# Patient Record
Sex: FEMALE | Race: OTHER | URBAN - METROPOLITAN AREA
[De-identification: names, ages, dates, MRNs, and addresses within clinical notes are randomized per-mention and may not be internally consistent; named-entity substitution may affect disease eponyms.]

---

## 2021-01-01 ENCOUNTER — EMERGENCY (EMERGENCY)
Facility: HOSPITAL | Age: 86
LOS: 1 days | Discharge: HOME | End: 2021-01-01
Attending: EMERGENCY MEDICINE | Admitting: EMERGENCY MEDICINE
Payer: MEDICARE

## 2021-01-01 ENCOUNTER — INPATIENT (INPATIENT)
Facility: HOSPITAL | Age: 86
LOS: 14 days | End: 2021-06-19
Attending: HOSPITALIST | Admitting: HOSPITALIST
Payer: MEDICARE

## 2021-01-01 VITALS
HEART RATE: 96 BPM | RESPIRATION RATE: 20 BRPM | DIASTOLIC BLOOD PRESSURE: 60 MMHG | OXYGEN SATURATION: 96 % | SYSTOLIC BLOOD PRESSURE: 133 MMHG

## 2021-01-01 VITALS
SYSTOLIC BLOOD PRESSURE: 116 MMHG | RESPIRATION RATE: 20 BRPM | OXYGEN SATURATION: 100 % | DIASTOLIC BLOOD PRESSURE: 61 MMHG | HEART RATE: 102 BPM | TEMPERATURE: 99 F

## 2021-01-01 VITALS
HEART RATE: 133 BPM | DIASTOLIC BLOOD PRESSURE: 72 MMHG | OXYGEN SATURATION: 97 % | SYSTOLIC BLOOD PRESSURE: 114 MMHG | RESPIRATION RATE: 16 BRPM

## 2021-01-01 VITALS
DIASTOLIC BLOOD PRESSURE: 50 MMHG | SYSTOLIC BLOOD PRESSURE: 100 MMHG | HEART RATE: 101 BPM | OXYGEN SATURATION: 95 % | TEMPERATURE: 97 F | RESPIRATION RATE: 20 BRPM

## 2021-01-01 DIAGNOSIS — T68.XXXA HYPOTHERMIA, INITIAL ENCOUNTER: ICD-10-CM

## 2021-01-01 DIAGNOSIS — Z02.9 ENCOUNTER FOR ADMINISTRATIVE EXAMINATIONS, UNSPECIFIED: ICD-10-CM

## 2021-01-01 DIAGNOSIS — Z71.89 OTHER SPECIFIED COUNSELING: ICD-10-CM

## 2021-01-01 DIAGNOSIS — N17.9 ACUTE KIDNEY FAILURE, UNSPECIFIED: ICD-10-CM

## 2021-01-01 DIAGNOSIS — I10 ESSENTIAL (PRIMARY) HYPERTENSION: ICD-10-CM

## 2021-01-01 DIAGNOSIS — R52 PAIN, UNSPECIFIED: ICD-10-CM

## 2021-01-01 DIAGNOSIS — N39.0 URINARY TRACT INFECTION, SITE NOT SPECIFIED: ICD-10-CM

## 2021-01-01 DIAGNOSIS — R13.10 DYSPHAGIA, UNSPECIFIED: ICD-10-CM

## 2021-01-01 DIAGNOSIS — A41.9 SEPSIS, UNSPECIFIED ORGANISM: ICD-10-CM

## 2021-01-01 DIAGNOSIS — E87.2 ACIDOSIS: ICD-10-CM

## 2021-01-01 DIAGNOSIS — R62.51 FAILURE TO THRIVE (CHILD): ICD-10-CM

## 2021-01-01 DIAGNOSIS — G93.41 METABOLIC ENCEPHALOPATHY: ICD-10-CM

## 2021-01-01 DIAGNOSIS — S32.000A WEDGE COMPRESSION FRACTURE OF UNSPECIFIED LUMBAR VERTEBRA, INITIAL ENCOUNTER FOR CLOSED FRACTURE: ICD-10-CM

## 2021-01-01 DIAGNOSIS — Z51.5 ENCOUNTER FOR PALLIATIVE CARE: ICD-10-CM

## 2021-01-01 DIAGNOSIS — R62.7 ADULT FAILURE TO THRIVE: ICD-10-CM

## 2021-01-01 DIAGNOSIS — R53.2 FUNCTIONAL QUADRIPLEGIA: ICD-10-CM

## 2021-01-01 DIAGNOSIS — G30.9 ALZHEIMER'S DISEASE, UNSPECIFIED: ICD-10-CM

## 2021-01-01 DIAGNOSIS — E87.0 HYPEROSMOLALITY AND HYPERNATREMIA: ICD-10-CM

## 2021-01-01 DIAGNOSIS — D64.9 ANEMIA, UNSPECIFIED: ICD-10-CM

## 2021-01-01 DIAGNOSIS — R94.31 ABNORMAL ELECTROCARDIOGRAM [ECG] [EKG]: ICD-10-CM

## 2021-01-01 DIAGNOSIS — R00.1 BRADYCARDIA, UNSPECIFIED: ICD-10-CM

## 2021-01-01 LAB
-  AMIKACIN: SIGNIFICANT CHANGE UP
-  AMOXICILLIN/CLAVULANIC ACID: SIGNIFICANT CHANGE UP
-  AMPICILLIN/SULBACTAM: SIGNIFICANT CHANGE UP
-  AMPICILLIN: SIGNIFICANT CHANGE UP
-  AZTREONAM: SIGNIFICANT CHANGE UP
-  CEFAZOLIN: SIGNIFICANT CHANGE UP
-  CEFEPIME: SIGNIFICANT CHANGE UP
-  CEFOXITIN: SIGNIFICANT CHANGE UP
-  CEFTRIAXONE: SIGNIFICANT CHANGE UP
-  CIPROFLOXACIN: SIGNIFICANT CHANGE UP
-  ERTAPENEM: SIGNIFICANT CHANGE UP
-  GENTAMICIN: SIGNIFICANT CHANGE UP
-  IMIPENEM: SIGNIFICANT CHANGE UP
-  LEVOFLOXACIN: SIGNIFICANT CHANGE UP
-  MEROPENEM: SIGNIFICANT CHANGE UP
-  NITROFURANTOIN: SIGNIFICANT CHANGE UP
-  PIPERACILLIN/TAZOBACTAM: SIGNIFICANT CHANGE UP
-  TIGECYCLINE: SIGNIFICANT CHANGE UP
-  TOBRAMYCIN: SIGNIFICANT CHANGE UP
-  TRIMETHOPRIM/SULFAMETHOXAZOLE: SIGNIFICANT CHANGE UP
ALBUMIN SERPL ELPH-MCNC: 2.8 G/DL — LOW (ref 3.3–5)
ALBUMIN SERPL ELPH-MCNC: 3 G/DL — LOW (ref 3.3–5)
ALP SERPL-CCNC: 104 U/L — SIGNIFICANT CHANGE UP (ref 40–120)
ALP SERPL-CCNC: 116 U/L — SIGNIFICANT CHANGE UP (ref 40–120)
ALT FLD-CCNC: 22 U/L — SIGNIFICANT CHANGE UP (ref 4–33)
ALT FLD-CCNC: 73 U/L — HIGH (ref 4–33)
ANION GAP SERPL CALC-SCNC: 11 MMOL/L — SIGNIFICANT CHANGE UP (ref 7–14)
ANION GAP SERPL CALC-SCNC: 16 MMOL/L — HIGH (ref 7–14)
ANION GAP SERPL CALC-SCNC: 16 MMOL/L — HIGH (ref 7–14)
ANION GAP SERPL CALC-SCNC: 17 MMOL/L — HIGH (ref 7–14)
ANION GAP SERPL CALC-SCNC: 18 MMOL/L — HIGH (ref 7–14)
ANION GAP SERPL CALC-SCNC: 18 MMOL/L — HIGH (ref 7–14)
ANION GAP SERPL CALC-SCNC: 19 MMOL/L — HIGH (ref 7–14)
ANION GAP SERPL CALC-SCNC: 19 MMOL/L — HIGH (ref 7–14)
ANION GAP SERPL CALC-SCNC: 20 MMOL/L — HIGH (ref 7–14)
ANION GAP SERPL CALC-SCNC: 21 MMOL/L — HIGH (ref 7–14)
ANION GAP SERPL CALC-SCNC: 21 MMOL/L — HIGH (ref 7–14)
ANION GAP SERPL CALC-SCNC: 25 MMOL/L — HIGH (ref 7–14)
APPEARANCE UR: ABNORMAL
APPEARANCE UR: ABNORMAL
APPEARANCE UR: CLEAR — SIGNIFICANT CHANGE UP
APTT BLD: 29.6 SEC — SIGNIFICANT CHANGE UP (ref 27–36.3)
AST SERPL-CCNC: 28 U/L — SIGNIFICANT CHANGE UP (ref 4–32)
AST SERPL-CCNC: 41 U/L — HIGH (ref 4–32)
BACTERIA # UR AUTO: ABNORMAL
BACTERIA # UR AUTO: ABNORMAL
BASE EXCESS BLDV CALC-SCNC: -2.1 MMOL/L — SIGNIFICANT CHANGE UP (ref -3–2)
BASE EXCESS BLDV CALC-SCNC: 3.6 MMOL/L — HIGH (ref -3–2)
BASOPHILS # BLD AUTO: 0.02 K/UL — SIGNIFICANT CHANGE UP (ref 0–0.2)
BASOPHILS # BLD AUTO: 0.02 K/UL — SIGNIFICANT CHANGE UP (ref 0–0.2)
BASOPHILS # BLD AUTO: 0.03 K/UL — SIGNIFICANT CHANGE UP (ref 0–0.2)
BASOPHILS # BLD AUTO: 0.04 K/UL — SIGNIFICANT CHANGE UP (ref 0–0.2)
BASOPHILS NFR BLD AUTO: 0.1 % — SIGNIFICANT CHANGE UP (ref 0–2)
BASOPHILS NFR BLD AUTO: 0.1 % — SIGNIFICANT CHANGE UP (ref 0–2)
BASOPHILS NFR BLD AUTO: 0.2 % — SIGNIFICANT CHANGE UP (ref 0–2)
BASOPHILS NFR BLD AUTO: 0.3 % — SIGNIFICANT CHANGE UP (ref 0–2)
BILIRUB SERPL-MCNC: 0.3 MG/DL — SIGNIFICANT CHANGE UP (ref 0.2–1.2)
BILIRUB SERPL-MCNC: 0.6 MG/DL — SIGNIFICANT CHANGE UP (ref 0.2–1.2)
BILIRUB UR-MCNC: NEGATIVE — SIGNIFICANT CHANGE UP
BLOOD GAS VENOUS - CREATININE: 0.5 MG/DL — SIGNIFICANT CHANGE UP (ref 0.5–1.3)
BLOOD GAS VENOUS - CREATININE: 5.7 MG/DL — HIGH (ref 0.5–1.3)
BLOOD GAS VENOUS COMPREHENSIVE RESULT: SIGNIFICANT CHANGE UP
BUN SERPL-MCNC: 105 MG/DL — HIGH (ref 7–23)
BUN SERPL-MCNC: 105 MG/DL — HIGH (ref 7–23)
BUN SERPL-MCNC: 109 MG/DL — HIGH (ref 7–23)
BUN SERPL-MCNC: 110 MG/DL — HIGH (ref 7–23)
BUN SERPL-MCNC: 112 MG/DL — HIGH (ref 7–23)
BUN SERPL-MCNC: 118 MG/DL — HIGH (ref 7–23)
BUN SERPL-MCNC: 122 MG/DL — HIGH (ref 7–23)
BUN SERPL-MCNC: 124 MG/DL — HIGH (ref 7–23)
BUN SERPL-MCNC: 129 MG/DL — HIGH (ref 7–23)
BUN SERPL-MCNC: 130 MG/DL — HIGH (ref 7–23)
BUN SERPL-MCNC: 139 MG/DL — HIGH (ref 7–23)
BUN SERPL-MCNC: 139 MG/DL — HIGH (ref 7–23)
BUN SERPL-MCNC: 14 MG/DL — SIGNIFICANT CHANGE UP (ref 7–23)
BUN SERPL-MCNC: 140 MG/DL — HIGH (ref 7–23)
BUN SERPL-MCNC: 144 MG/DL — HIGH (ref 7–23)
CALCIUM SERPL-MCNC: 7 MG/DL — LOW (ref 8.4–10.5)
CALCIUM SERPL-MCNC: 7.2 MG/DL — LOW (ref 8.4–10.5)
CALCIUM SERPL-MCNC: 7.3 MG/DL — LOW (ref 8.4–10.5)
CALCIUM SERPL-MCNC: 7.4 MG/DL — LOW (ref 8.4–10.5)
CALCIUM SERPL-MCNC: 7.5 MG/DL — LOW (ref 8.4–10.5)
CALCIUM SERPL-MCNC: 7.6 MG/DL — LOW (ref 8.4–10.5)
CALCIUM SERPL-MCNC: 7.6 MG/DL — LOW (ref 8.4–10.5)
CALCIUM SERPL-MCNC: 7.8 MG/DL — LOW (ref 8.4–10.5)
CALCIUM SERPL-MCNC: 7.9 MG/DL — LOW (ref 8.4–10.5)
CALCIUM SERPL-MCNC: 8 MG/DL — LOW (ref 8.4–10.5)
CALCIUM SERPL-MCNC: 8 MG/DL — LOW (ref 8.4–10.5)
CALCIUM SERPL-MCNC: 8.1 MG/DL — LOW (ref 8.4–10.5)
CALCIUM SERPL-MCNC: 8.2 MG/DL — LOW (ref 8.4–10.5)
CALCIUM SERPL-MCNC: 8.6 MG/DL — SIGNIFICANT CHANGE UP (ref 8.4–10.5)
CALCIUM SERPL-MCNC: 8.7 MG/DL — SIGNIFICANT CHANGE UP (ref 8.4–10.5)
CHLORIDE BLDV-SCNC: 102 MMOL/L — SIGNIFICANT CHANGE UP (ref 96–108)
CHLORIDE BLDV-SCNC: >120 MMOL/L — HIGH (ref 96–108)
CHLORIDE SERPL-SCNC: 106 MMOL/L — SIGNIFICANT CHANGE UP (ref 98–107)
CHLORIDE SERPL-SCNC: 108 MMOL/L — HIGH (ref 98–107)
CHLORIDE SERPL-SCNC: 108 MMOL/L — HIGH (ref 98–107)
CHLORIDE SERPL-SCNC: 109 MMOL/L — HIGH (ref 98–107)
CHLORIDE SERPL-SCNC: 117 MMOL/L — HIGH (ref 98–107)
CHLORIDE SERPL-SCNC: 121 MMOL/L — HIGH (ref 98–107)
CHLORIDE SERPL-SCNC: 122 MMOL/L — HIGH (ref 98–107)
CHLORIDE SERPL-SCNC: 91 MMOL/L — LOW (ref 98–107)
CHLORIDE SERPL-SCNC: 94 MMOL/L — LOW (ref 98–107)
CHLORIDE SERPL-SCNC: 94 MMOL/L — LOW (ref 98–107)
CHLORIDE SERPL-SCNC: 95 MMOL/L — LOW (ref 98–107)
CHLORIDE SERPL-SCNC: 96 MMOL/L — LOW (ref 98–107)
CK SERPL-CCNC: 22 U/L — LOW (ref 25–170)
CO2 SERPL-SCNC: 10 MMOL/L — CRITICAL LOW (ref 22–31)
CO2 SERPL-SCNC: 11 MMOL/L — LOW (ref 22–31)
CO2 SERPL-SCNC: 11 MMOL/L — LOW (ref 22–31)
CO2 SERPL-SCNC: 12 MMOL/L — LOW (ref 22–31)
CO2 SERPL-SCNC: 15 MMOL/L — LOW (ref 22–31)
CO2 SERPL-SCNC: 16 MMOL/L — LOW (ref 22–31)
CO2 SERPL-SCNC: 17 MMOL/L — LOW (ref 22–31)
CO2 SERPL-SCNC: 18 MMOL/L — LOW (ref 22–31)
CO2 SERPL-SCNC: 19 MMOL/L — LOW (ref 22–31)
CO2 SERPL-SCNC: 20 MMOL/L — LOW (ref 22–31)
CO2 SERPL-SCNC: 21 MMOL/L — LOW (ref 22–31)
CO2 SERPL-SCNC: 22 MMOL/L — SIGNIFICANT CHANGE UP (ref 22–31)
CO2 SERPL-SCNC: 26 MMOL/L — SIGNIFICANT CHANGE UP (ref 22–31)
COLOR SPEC: ABNORMAL
COLOR SPEC: SIGNIFICANT CHANGE UP
COLOR SPEC: SIGNIFICANT CHANGE UP
COVID-19 SPIKE DOMAIN AB INTERP: POSITIVE
COVID-19 SPIKE DOMAIN ANTIBODY RESULT: >250 U/ML — HIGH
CREAT SERPL-MCNC: 0.45 MG/DL — LOW (ref 0.5–1.3)
CREAT SERPL-MCNC: 4.98 MG/DL — HIGH (ref 0.5–1.3)
CREAT SERPL-MCNC: 5.04 MG/DL — HIGH (ref 0.5–1.3)
CREAT SERPL-MCNC: 5.15 MG/DL — HIGH (ref 0.5–1.3)
CREAT SERPL-MCNC: 5.17 MG/DL — HIGH (ref 0.5–1.3)
CREAT SERPL-MCNC: 5.22 MG/DL — HIGH (ref 0.5–1.3)
CREAT SERPL-MCNC: 5.27 MG/DL — HIGH (ref 0.5–1.3)
CREAT SERPL-MCNC: 5.28 MG/DL — HIGH (ref 0.5–1.3)
CREAT SERPL-MCNC: 5.29 MG/DL — HIGH (ref 0.5–1.3)
CREAT SERPL-MCNC: 5.36 MG/DL — HIGH (ref 0.5–1.3)
CREAT SERPL-MCNC: 5.53 MG/DL — HIGH (ref 0.5–1.3)
CREAT SERPL-MCNC: 5.55 MG/DL — HIGH (ref 0.5–1.3)
CREAT SERPL-MCNC: 5.61 MG/DL — HIGH (ref 0.5–1.3)
CREAT SERPL-MCNC: 5.63 MG/DL — HIGH (ref 0.5–1.3)
CREAT SERPL-MCNC: 5.96 MG/DL — HIGH (ref 0.5–1.3)
CULTURE RESULTS: SIGNIFICANT CHANGE UP
DIFF PNL FLD: ABNORMAL
EOSINOPHIL # BLD AUTO: 0 K/UL — SIGNIFICANT CHANGE UP (ref 0–0.5)
EOSINOPHIL # BLD AUTO: 0.05 K/UL — SIGNIFICANT CHANGE UP (ref 0–0.5)
EOSINOPHIL # BLD AUTO: 0.16 K/UL — SIGNIFICANT CHANGE UP (ref 0–0.5)
EOSINOPHIL # BLD AUTO: 0.17 K/UL — SIGNIFICANT CHANGE UP (ref 0–0.5)
EOSINOPHIL NFR BLD AUTO: 0 % — SIGNIFICANT CHANGE UP (ref 0–6)
EOSINOPHIL NFR BLD AUTO: 0.2 % — SIGNIFICANT CHANGE UP (ref 0–6)
EOSINOPHIL NFR BLD AUTO: 0.9 % — SIGNIFICANT CHANGE UP (ref 0–6)
EOSINOPHIL NFR BLD AUTO: 1.2 % — SIGNIFICANT CHANGE UP (ref 0–6)
EPI CELLS # UR: 2 /HPF — SIGNIFICANT CHANGE UP (ref 0–5)
EPI CELLS # UR: SIGNIFICANT CHANGE UP
FERRITIN SERPL-MCNC: 732 NG/ML — HIGH (ref 15–150)
GAS PNL BLDV: 129 MMOL/L — LOW (ref 136–146)
GAS PNL BLDV: 165 MMOL/L — CRITICAL HIGH (ref 136–146)
GLUCOSE BLDC GLUCOMTR-MCNC: 100 MG/DL — HIGH (ref 70–99)
GLUCOSE BLDC GLUCOMTR-MCNC: 138 MG/DL — HIGH (ref 70–99)
GLUCOSE BLDC GLUCOMTR-MCNC: 142 MG/DL — HIGH (ref 70–99)
GLUCOSE BLDC GLUCOMTR-MCNC: 152 MG/DL — HIGH (ref 70–99)
GLUCOSE BLDC GLUCOMTR-MCNC: 161 MG/DL — HIGH (ref 70–99)
GLUCOSE BLDC GLUCOMTR-MCNC: 181 MG/DL — HIGH (ref 70–99)
GLUCOSE BLDV-MCNC: 108 MG/DL — HIGH (ref 70–99)
GLUCOSE BLDV-MCNC: 148 MG/DL — HIGH (ref 70–99)
GLUCOSE SERPL-MCNC: 105 MG/DL — HIGH (ref 70–99)
GLUCOSE SERPL-MCNC: 107 MG/DL — HIGH (ref 70–99)
GLUCOSE SERPL-MCNC: 111 MG/DL — HIGH (ref 70–99)
GLUCOSE SERPL-MCNC: 123 MG/DL — HIGH (ref 70–99)
GLUCOSE SERPL-MCNC: 126 MG/DL — HIGH (ref 70–99)
GLUCOSE SERPL-MCNC: 132 MG/DL — HIGH (ref 70–99)
GLUCOSE SERPL-MCNC: 146 MG/DL — HIGH (ref 70–99)
GLUCOSE SERPL-MCNC: 157 MG/DL — HIGH (ref 70–99)
GLUCOSE SERPL-MCNC: 167 MG/DL — HIGH (ref 70–99)
GLUCOSE SERPL-MCNC: 172 MG/DL — HIGH (ref 70–99)
GLUCOSE SERPL-MCNC: 195 MG/DL — HIGH (ref 70–99)
GLUCOSE SERPL-MCNC: 82 MG/DL — SIGNIFICANT CHANGE UP (ref 70–99)
GLUCOSE SERPL-MCNC: 92 MG/DL — SIGNIFICANT CHANGE UP (ref 70–99)
GLUCOSE SERPL-MCNC: 94 MG/DL — SIGNIFICANT CHANGE UP (ref 70–99)
GLUCOSE SERPL-MCNC: 96 MG/DL — SIGNIFICANT CHANGE UP (ref 70–99)
GLUCOSE UR QL: NEGATIVE — SIGNIFICANT CHANGE UP
HCO3 BLDV-SCNC: 22 MMOL/L — SIGNIFICANT CHANGE UP (ref 20–27)
HCO3 BLDV-SCNC: 27 MMOL/L — SIGNIFICANT CHANGE UP (ref 20–27)
HCT VFR BLD CALC: 25.5 % — LOW (ref 34.5–45)
HCT VFR BLD CALC: 26.2 % — LOW (ref 34.5–45)
HCT VFR BLD CALC: 26.5 % — LOW (ref 34.5–45)
HCT VFR BLD CALC: 27.7 % — LOW (ref 34.5–45)
HCT VFR BLD CALC: 29.9 % — LOW (ref 34.5–45)
HCT VFR BLD CALC: 35.1 % — SIGNIFICANT CHANGE UP (ref 34.5–45)
HCT VFR BLDA CALC: 29.3 % — LOW (ref 34.5–46.5)
HCT VFR BLDA CALC: 31.1 % — LOW (ref 34.5–46.5)
HGB BLD CALC-MCNC: 10.1 G/DL — LOW (ref 11.5–15.5)
HGB BLD CALC-MCNC: 9.5 G/DL — LOW (ref 11.5–15.5)
HGB BLD-MCNC: 10.2 G/DL — LOW (ref 11.5–15.5)
HGB BLD-MCNC: 7.6 G/DL — LOW (ref 11.5–15.5)
HGB BLD-MCNC: 7.6 G/DL — LOW (ref 11.5–15.5)
HGB BLD-MCNC: 8 G/DL — LOW (ref 11.5–15.5)
HGB BLD-MCNC: 8 G/DL — LOW (ref 11.5–15.5)
HGB BLD-MCNC: 9.2 G/DL — LOW (ref 11.5–15.5)
HYALINE CASTS # UR AUTO: 1 /LPF — SIGNIFICANT CHANGE UP (ref 0–7)
IANC: 10.83 K/UL — HIGH (ref 1.5–8.5)
IANC: 13.39 K/UL — HIGH (ref 1.5–8.5)
IANC: 16.73 K/UL — HIGH (ref 1.5–8.5)
IANC: 17.31 K/UL — HIGH (ref 1.5–8.5)
IMM GRANULOCYTES NFR BLD AUTO: 0.8 % — SIGNIFICANT CHANGE UP (ref 0–1.5)
IMM GRANULOCYTES NFR BLD AUTO: 0.9 % — SIGNIFICANT CHANGE UP (ref 0–1.5)
IMM GRANULOCYTES NFR BLD AUTO: 1.1 % — SIGNIFICANT CHANGE UP (ref 0–1.5)
IMM GRANULOCYTES NFR BLD AUTO: 1.5 % — SIGNIFICANT CHANGE UP (ref 0–1.5)
INR BLD: 1.15 RATIO — SIGNIFICANT CHANGE UP (ref 0.88–1.16)
IRON SATN MFR SERPL: 20 % — SIGNIFICANT CHANGE UP (ref 14–50)
IRON SATN MFR SERPL: 25 UG/DL — LOW (ref 30–160)
KETONES UR-MCNC: NEGATIVE — SIGNIFICANT CHANGE UP
LACTATE BLDV-MCNC: 1.7 MMOL/L — SIGNIFICANT CHANGE UP (ref 0.5–2)
LACTATE BLDV-MCNC: 3.5 MMOL/L — HIGH (ref 0.5–2)
LACTATE SERPL-SCNC: 2.5 MMOL/L — HIGH (ref 0.5–2)
LEUKOCYTE ESTERASE UR-ACNC: ABNORMAL
LEUKOCYTE ESTERASE UR-ACNC: ABNORMAL
LEUKOCYTE ESTERASE UR-ACNC: NEGATIVE — SIGNIFICANT CHANGE UP
LYMPHOCYTES # BLD AUTO: 1.9 K/UL — SIGNIFICANT CHANGE UP (ref 1–3.3)
LYMPHOCYTES # BLD AUTO: 12.1 % — LOW (ref 13–44)
LYMPHOCYTES # BLD AUTO: 12.5 % — LOW (ref 13–44)
LYMPHOCYTES # BLD AUTO: 13.5 % — SIGNIFICANT CHANGE UP (ref 13–44)
LYMPHOCYTES # BLD AUTO: 16.7 % — SIGNIFICANT CHANGE UP (ref 13–44)
LYMPHOCYTES # BLD AUTO: 2.45 K/UL — SIGNIFICANT CHANGE UP (ref 1–3.3)
LYMPHOCYTES # BLD AUTO: 2.64 K/UL — SIGNIFICANT CHANGE UP (ref 1–3.3)
LYMPHOCYTES # BLD AUTO: 2.93 K/UL — SIGNIFICANT CHANGE UP (ref 1–3.3)
MAGNESIUM SERPL-MCNC: 2 MG/DL — SIGNIFICANT CHANGE UP (ref 1.6–2.6)
MAGNESIUM SERPL-MCNC: 2 MG/DL — SIGNIFICANT CHANGE UP (ref 1.6–2.6)
MAGNESIUM SERPL-MCNC: 2.1 MG/DL — SIGNIFICANT CHANGE UP (ref 1.6–2.6)
MAGNESIUM SERPL-MCNC: 2.1 MG/DL — SIGNIFICANT CHANGE UP (ref 1.6–2.6)
MAGNESIUM SERPL-MCNC: 2.3 MG/DL — SIGNIFICANT CHANGE UP (ref 1.6–2.6)
MAGNESIUM SERPL-MCNC: 2.5 MG/DL — SIGNIFICANT CHANGE UP (ref 1.6–2.6)
MAGNESIUM SERPL-MCNC: 2.5 MG/DL — SIGNIFICANT CHANGE UP (ref 1.6–2.6)
MAGNESIUM SERPL-MCNC: 2.7 MG/DL — HIGH (ref 1.6–2.6)
MAGNESIUM SERPL-MCNC: 2.8 MG/DL — HIGH (ref 1.6–2.6)
MAGNESIUM SERPL-MCNC: 3 MG/DL — HIGH (ref 1.6–2.6)
MAGNESIUM SERPL-MCNC: 3.1 MG/DL — HIGH (ref 1.6–2.6)
MAGNESIUM SERPL-MCNC: 3.2 MG/DL — HIGH (ref 1.6–2.6)
MCHC RBC-ENTMCNC: 26.6 PG — LOW (ref 27–34)
MCHC RBC-ENTMCNC: 26.8 PG — LOW (ref 27–34)
MCHC RBC-ENTMCNC: 26.8 PG — LOW (ref 27–34)
MCHC RBC-ENTMCNC: 26.9 PG — LOW (ref 27–34)
MCHC RBC-ENTMCNC: 27.1 PG — SIGNIFICANT CHANGE UP (ref 27–34)
MCHC RBC-ENTMCNC: 27.1 PG — SIGNIFICANT CHANGE UP (ref 27–34)
MCHC RBC-ENTMCNC: 28.9 GM/DL — LOW (ref 32–36)
MCHC RBC-ENTMCNC: 29 GM/DL — LOW (ref 32–36)
MCHC RBC-ENTMCNC: 29.1 GM/DL — LOW (ref 32–36)
MCHC RBC-ENTMCNC: 29.8 GM/DL — LOW (ref 32–36)
MCHC RBC-ENTMCNC: 30.2 GM/DL — LOW (ref 32–36)
MCHC RBC-ENTMCNC: 30.8 GM/DL — LOW (ref 32–36)
MCV RBC AUTO: 86.4 FL — SIGNIFICANT CHANGE UP (ref 80–100)
MCV RBC AUTO: 88.6 FL — SIGNIFICANT CHANGE UP (ref 80–100)
MCV RBC AUTO: 91.1 FL — SIGNIFICANT CHANGE UP (ref 80–100)
MCV RBC AUTO: 92.3 FL — SIGNIFICANT CHANGE UP (ref 80–100)
MCV RBC AUTO: 93.1 FL — SIGNIFICANT CHANGE UP (ref 80–100)
MCV RBC AUTO: 93.3 FL — SIGNIFICANT CHANGE UP (ref 80–100)
METHOD TYPE: SIGNIFICANT CHANGE UP
MONOCYTES # BLD AUTO: 0.76 K/UL — SIGNIFICANT CHANGE UP (ref 0–0.9)
MONOCYTES # BLD AUTO: 0.89 K/UL — SIGNIFICANT CHANGE UP (ref 0–0.9)
MONOCYTES # BLD AUTO: 0.95 K/UL — HIGH (ref 0–0.9)
MONOCYTES # BLD AUTO: 1.02 K/UL — HIGH (ref 0–0.9)
MONOCYTES NFR BLD AUTO: 4.3 % — SIGNIFICANT CHANGE UP (ref 2–14)
MONOCYTES NFR BLD AUTO: 4.4 % — SIGNIFICANT CHANGE UP (ref 2–14)
MONOCYTES NFR BLD AUTO: 4.5 % — SIGNIFICANT CHANGE UP (ref 2–14)
MONOCYTES NFR BLD AUTO: 7.2 % — SIGNIFICANT CHANGE UP (ref 2–14)
NEUTROPHILS # BLD AUTO: 10.83 K/UL — HIGH (ref 1.8–7.4)
NEUTROPHILS # BLD AUTO: 13.39 K/UL — HIGH (ref 1.8–7.4)
NEUTROPHILS # BLD AUTO: 16.73 K/UL — HIGH (ref 1.8–7.4)
NEUTROPHILS # BLD AUTO: 17.31 K/UL — HIGH (ref 1.8–7.4)
NEUTROPHILS NFR BLD AUTO: 76.4 % — SIGNIFICANT CHANGE UP (ref 43–77)
NEUTROPHILS NFR BLD AUTO: 77 % — SIGNIFICANT CHANGE UP (ref 43–77)
NEUTROPHILS NFR BLD AUTO: 81.6 % — HIGH (ref 43–77)
NEUTROPHILS NFR BLD AUTO: 82.5 % — HIGH (ref 43–77)
NITRITE UR-MCNC: NEGATIVE — SIGNIFICANT CHANGE UP
NITRITE UR-MCNC: NEGATIVE — SIGNIFICANT CHANGE UP
NITRITE UR-MCNC: POSITIVE
NRBC # BLD: 0 /100 WBCS — SIGNIFICANT CHANGE UP
NRBC # FLD: 0 K/UL — SIGNIFICANT CHANGE UP
NRBC # FLD: 0.02 K/UL — HIGH
ORGANISM # SPEC MICROSCOPIC CNT: SIGNIFICANT CHANGE UP
ORGANISM # SPEC MICROSCOPIC CNT: SIGNIFICANT CHANGE UP
PCO2 BLDV: 41 MMHG — SIGNIFICANT CHANGE UP (ref 41–51)
PCO2 BLDV: 43 MMHG — SIGNIFICANT CHANGE UP (ref 41–51)
PH BLDV: 7.36 — SIGNIFICANT CHANGE UP (ref 7.32–7.43)
PH BLDV: 7.43 — SIGNIFICANT CHANGE UP (ref 7.32–7.43)
PH UR: 6 — SIGNIFICANT CHANGE UP (ref 5–8)
PH UR: 6 — SIGNIFICANT CHANGE UP (ref 5–8)
PH UR: 7 — SIGNIFICANT CHANGE UP (ref 5–8)
PHOSPHATE SERPL-MCNC: 4.5 MG/DL — SIGNIFICANT CHANGE UP (ref 2.5–4.5)
PHOSPHATE SERPL-MCNC: 4.9 MG/DL — HIGH (ref 2.5–4.5)
PHOSPHATE SERPL-MCNC: 5 MG/DL — HIGH (ref 2.5–4.5)
PHOSPHATE SERPL-MCNC: 5.2 MG/DL — HIGH (ref 2.5–4.5)
PHOSPHATE SERPL-MCNC: 5.4 MG/DL — HIGH (ref 2.5–4.5)
PHOSPHATE SERPL-MCNC: 5.4 MG/DL — HIGH (ref 2.5–4.5)
PHOSPHATE SERPL-MCNC: 5.5 MG/DL — HIGH (ref 2.5–4.5)
PHOSPHATE SERPL-MCNC: 5.8 MG/DL — HIGH (ref 2.5–4.5)
PHOSPHATE SERPL-MCNC: 5.9 MG/DL — HIGH (ref 2.5–4.5)
PHOSPHATE SERPL-MCNC: 6.5 MG/DL — HIGH (ref 2.5–4.5)
PHOSPHATE SERPL-MCNC: 8.1 MG/DL — HIGH (ref 2.5–4.5)
PHOSPHATE SERPL-MCNC: 9.1 MG/DL — HIGH (ref 2.5–4.5)
PLATELET # BLD AUTO: 163 K/UL — SIGNIFICANT CHANGE UP (ref 150–400)
PLATELET # BLD AUTO: 179 K/UL — SIGNIFICANT CHANGE UP (ref 150–400)
PLATELET # BLD AUTO: 191 K/UL — SIGNIFICANT CHANGE UP (ref 150–400)
PLATELET # BLD AUTO: 247 K/UL — SIGNIFICANT CHANGE UP (ref 150–400)
PLATELET # BLD AUTO: 348 K/UL — SIGNIFICANT CHANGE UP (ref 150–400)
PLATELET # BLD AUTO: 655 K/UL — HIGH (ref 150–400)
PO2 BLDV: 28 MMHG — LOW (ref 35–40)
PO2 BLDV: 56 MMHG — HIGH (ref 35–40)
POTASSIUM BLDV-SCNC: 3.9 MMOL/L — SIGNIFICANT CHANGE UP (ref 3.4–4.5)
POTASSIUM BLDV-SCNC: 3.9 MMOL/L — SIGNIFICANT CHANGE UP (ref 3.4–4.5)
POTASSIUM SERPL-MCNC: 3.2 MMOL/L — LOW (ref 3.5–5.3)
POTASSIUM SERPL-MCNC: 3.5 MMOL/L — SIGNIFICANT CHANGE UP (ref 3.5–5.3)
POTASSIUM SERPL-MCNC: 3.5 MMOL/L — SIGNIFICANT CHANGE UP (ref 3.5–5.3)
POTASSIUM SERPL-MCNC: 3.8 MMOL/L — SIGNIFICANT CHANGE UP (ref 3.5–5.3)
POTASSIUM SERPL-MCNC: 4 MMOL/L — SIGNIFICANT CHANGE UP (ref 3.5–5.3)
POTASSIUM SERPL-MCNC: 4 MMOL/L — SIGNIFICANT CHANGE UP (ref 3.5–5.3)
POTASSIUM SERPL-MCNC: 4.1 MMOL/L — SIGNIFICANT CHANGE UP (ref 3.5–5.3)
POTASSIUM SERPL-MCNC: 4.1 MMOL/L — SIGNIFICANT CHANGE UP (ref 3.5–5.3)
POTASSIUM SERPL-MCNC: 4.2 MMOL/L — SIGNIFICANT CHANGE UP (ref 3.5–5.3)
POTASSIUM SERPL-SCNC: 3.2 MMOL/L — LOW (ref 3.5–5.3)
POTASSIUM SERPL-SCNC: 3.5 MMOL/L — SIGNIFICANT CHANGE UP (ref 3.5–5.3)
POTASSIUM SERPL-SCNC: 3.5 MMOL/L — SIGNIFICANT CHANGE UP (ref 3.5–5.3)
POTASSIUM SERPL-SCNC: 3.8 MMOL/L — SIGNIFICANT CHANGE UP (ref 3.5–5.3)
POTASSIUM SERPL-SCNC: 4 MMOL/L — SIGNIFICANT CHANGE UP (ref 3.5–5.3)
POTASSIUM SERPL-SCNC: 4 MMOL/L — SIGNIFICANT CHANGE UP (ref 3.5–5.3)
POTASSIUM SERPL-SCNC: 4.1 MMOL/L — SIGNIFICANT CHANGE UP (ref 3.5–5.3)
POTASSIUM SERPL-SCNC: 4.1 MMOL/L — SIGNIFICANT CHANGE UP (ref 3.5–5.3)
POTASSIUM SERPL-SCNC: 4.2 MMOL/L — SIGNIFICANT CHANGE UP (ref 3.5–5.3)
PROCALCITONIN SERPL-MCNC: 0.64 NG/ML — HIGH (ref 0.02–0.1)
PROT SERPL-MCNC: 7.1 G/DL — SIGNIFICANT CHANGE UP (ref 6–8.3)
PROT SERPL-MCNC: 8 G/DL — SIGNIFICANT CHANGE UP (ref 6–8.3)
PROT UR-MCNC: ABNORMAL
PROTHROM AB SERPL-ACNC: 13.1 SEC — SIGNIFICANT CHANGE UP (ref 10.6–13.6)
RBC # BLD: 2.8 M/UL — LOW (ref 3.8–5.2)
RBC # BLD: 2.84 M/UL — LOW (ref 3.8–5.2)
RBC # BLD: 2.97 M/UL — LOW (ref 3.8–5.2)
RBC # BLD: 2.99 M/UL — LOW (ref 3.8–5.2)
RBC # BLD: 3.46 M/UL — LOW (ref 3.8–5.2)
RBC # BLD: 3.77 M/UL — LOW (ref 3.8–5.2)
RBC # FLD: 17.6 % — HIGH (ref 10.3–14.5)
RBC # FLD: 18.5 % — HIGH (ref 10.3–14.5)
RBC # FLD: 18.6 % — HIGH (ref 10.3–14.5)
RBC # FLD: 19 % — HIGH (ref 10.3–14.5)
RBC # FLD: 19.3 % — HIGH (ref 10.3–14.5)
RBC # FLD: 19.4 % — HIGH (ref 10.3–14.5)
RBC CASTS # UR COMP ASSIST: 7 /HPF — HIGH (ref 0–4)
RBC CASTS # UR COMP ASSIST: SIGNIFICANT CHANGE UP /HPF (ref 0–4)
SAO2 % BLDV: 46.6 % — LOW (ref 60–85)
SAO2 % BLDV: 81.7 % — SIGNIFICANT CHANGE UP (ref 60–85)
SARS-COV-2 IGG+IGM SERPL QL IA: >250 U/ML — HIGH
SARS-COV-2 IGG+IGM SERPL QL IA: POSITIVE
SARS-COV-2 RNA SPEC QL NAA+PROBE: SIGNIFICANT CHANGE UP
SODIUM SERPL-SCNC: 122 MMOL/L — LOW (ref 135–145)
SODIUM SERPL-SCNC: 126 MMOL/L — LOW (ref 135–145)
SODIUM SERPL-SCNC: 126 MMOL/L — LOW (ref 135–145)
SODIUM SERPL-SCNC: 127 MMOL/L — LOW (ref 135–145)
SODIUM SERPL-SCNC: 131 MMOL/L — LOW (ref 135–145)
SODIUM SERPL-SCNC: 138 MMOL/L — SIGNIFICANT CHANGE UP (ref 135–145)
SODIUM SERPL-SCNC: 142 MMOL/L — SIGNIFICANT CHANGE UP (ref 135–145)
SODIUM SERPL-SCNC: 142 MMOL/L — SIGNIFICANT CHANGE UP (ref 135–145)
SODIUM SERPL-SCNC: 144 MMOL/L — SIGNIFICANT CHANGE UP (ref 135–145)
SODIUM SERPL-SCNC: 151 MMOL/L — HIGH (ref 135–145)
SODIUM SERPL-SCNC: 151 MMOL/L — HIGH (ref 135–145)
SODIUM SERPL-SCNC: 153 MMOL/L — HIGH (ref 135–145)
SODIUM SERPL-SCNC: 159 MMOL/L — HIGH (ref 135–145)
SODIUM SERPL-SCNC: 160 MMOL/L — CRITICAL HIGH (ref 135–145)
SODIUM SERPL-SCNC: 164 MMOL/L — CRITICAL HIGH (ref 135–145)
SP GR SPEC: 1.01 — SIGNIFICANT CHANGE UP (ref 1.01–1.02)
SP GR SPEC: 1.01 — SIGNIFICANT CHANGE UP (ref 1.01–1.02)
SP GR SPEC: 1.02 — SIGNIFICANT CHANGE UP (ref 1.01–1.02)
SPECIMEN SOURCE: SIGNIFICANT CHANGE UP
T3 SERPL-MCNC: 75 NG/DL — LOW (ref 80–200)
T4 FREE SERPL-MCNC: 1.1 NG/DL — SIGNIFICANT CHANGE UP (ref 0.9–1.8)
TIBC SERPL-MCNC: 126 UG/DL — LOW (ref 220–430)
TROPONIN T, HIGH SENSITIVITY RESULT: 193 NG/L — CRITICAL HIGH
TROPONIN T, HIGH SENSITIVITY RESULT: 228 NG/L — CRITICAL HIGH
TSH SERPL-MCNC: 7.29 UIU/ML — HIGH (ref 0.27–4.2)
UIBC SERPL-MCNC: 101 UG/DL — LOW (ref 110–370)
UROBILINOGEN FLD QL: ABNORMAL
UROBILINOGEN FLD QL: SIGNIFICANT CHANGE UP
UROBILINOGEN FLD QL: SIGNIFICANT CHANGE UP
WBC # BLD: 12.25 K/UL — HIGH (ref 3.8–10.5)
WBC # BLD: 14.07 K/UL — HIGH (ref 3.8–10.5)
WBC # BLD: 15.81 K/UL — HIGH (ref 3.8–10.5)
WBC # BLD: 17.53 K/UL — HIGH (ref 3.8–10.5)
WBC # BLD: 20.28 K/UL — HIGH (ref 3.8–10.5)
WBC # BLD: 21.2 K/UL — HIGH (ref 3.8–10.5)
WBC # FLD AUTO: 12.25 K/UL — HIGH (ref 3.8–10.5)
WBC # FLD AUTO: 14.07 K/UL — HIGH (ref 3.8–10.5)
WBC # FLD AUTO: 15.81 K/UL — HIGH (ref 3.8–10.5)
WBC # FLD AUTO: 17.53 K/UL — HIGH (ref 3.8–10.5)
WBC # FLD AUTO: 20.28 K/UL — HIGH (ref 3.8–10.5)
WBC # FLD AUTO: 21.2 K/UL — HIGH (ref 3.8–10.5)
WBC UR QL: 36 /HPF — HIGH (ref 0–5)
WBC UR QL: SIGNIFICANT CHANGE UP /HPF (ref 0–5)

## 2021-01-01 PROCEDURE — 99233 SBSQ HOSP IP/OBS HIGH 50: CPT

## 2021-01-01 PROCEDURE — 99497 ADVNCD CARE PLAN 30 MIN: CPT | Mod: 25

## 2021-01-01 PROCEDURE — 99285 EMERGENCY DEPT VISIT HI MDM: CPT | Mod: 25

## 2021-01-01 PROCEDURE — 71045 X-RAY EXAM CHEST 1 VIEW: CPT | Mod: 26

## 2021-01-01 PROCEDURE — 99358 PROLONG SERVICE W/O CONTACT: CPT

## 2021-01-01 PROCEDURE — 93010 ELECTROCARDIOGRAM REPORT: CPT

## 2021-01-01 PROCEDURE — 99232 SBSQ HOSP IP/OBS MODERATE 35: CPT

## 2021-01-01 PROCEDURE — 99238 HOSP IP/OBS DSCHRG MGMT 30/<: CPT

## 2021-01-01 PROCEDURE — 71045 X-RAY EXAM CHEST 1 VIEW: CPT | Mod: 26,77

## 2021-01-01 PROCEDURE — 99223 1ST HOSP IP/OBS HIGH 75: CPT

## 2021-01-01 PROCEDURE — 12345: CPT | Mod: NC

## 2021-01-01 PROCEDURE — 99223 1ST HOSP IP/OBS HIGH 75: CPT | Mod: GC

## 2021-01-01 PROCEDURE — 74176 CT ABD & PELVIS W/O CONTRAST: CPT | Mod: 26

## 2021-01-01 PROCEDURE — 70450 CT HEAD/BRAIN W/O DYE: CPT | Mod: 26

## 2021-01-01 RX ORDER — SODIUM CHLORIDE 9 MG/ML
1000 INJECTION, SOLUTION INTRAVENOUS
Refills: 0 | Status: DISCONTINUED | OUTPATIENT
Start: 2021-01-01 | End: 2021-01-01

## 2021-01-01 RX ORDER — ACETAMINOPHEN 500 MG
1000 TABLET ORAL ONCE
Refills: 0 | Status: COMPLETED | OUTPATIENT
Start: 2021-01-01 | End: 2021-01-01

## 2021-01-01 RX ORDER — POTASSIUM CHLORIDE 20 MEQ
10 PACKET (EA) ORAL
Refills: 0 | Status: COMPLETED | OUTPATIENT
Start: 2021-01-01 | End: 2021-01-01

## 2021-01-01 RX ORDER — CEFPODOXIME PROXETIL 100 MG
100 TABLET ORAL ONCE
Refills: 0 | Status: COMPLETED | OUTPATIENT
Start: 2021-01-01 | End: 2021-01-01

## 2021-01-01 RX ORDER — ACETAMINOPHEN 500 MG
500 TABLET ORAL ONCE
Refills: 0 | Status: COMPLETED | OUTPATIENT
Start: 2021-01-01 | End: 2021-01-01

## 2021-01-01 RX ORDER — SODIUM CHLORIDE 9 MG/ML
1000 INJECTION, SOLUTION INTRAVENOUS
Refills: 0 | Status: COMPLETED | OUTPATIENT
Start: 2021-01-01 | End: 2021-01-01

## 2021-01-01 RX ORDER — ACETAMINOPHEN 500 MG
650 TABLET ORAL ONCE
Refills: 0 | Status: COMPLETED | OUTPATIENT
Start: 2021-01-01 | End: 2021-01-01

## 2021-01-01 RX ORDER — CEFTRIAXONE 500 MG/1
1000 INJECTION, POWDER, FOR SOLUTION INTRAMUSCULAR; INTRAVENOUS ONCE
Refills: 0 | Status: COMPLETED | OUTPATIENT
Start: 2021-01-01 | End: 2021-01-01

## 2021-01-01 RX ORDER — PIPERACILLIN AND TAZOBACTAM 4; .5 G/20ML; G/20ML
3.38 INJECTION, POWDER, LYOPHILIZED, FOR SOLUTION INTRAVENOUS ONCE
Refills: 0 | Status: COMPLETED | OUTPATIENT
Start: 2021-01-01 | End: 2021-01-01

## 2021-01-01 RX ORDER — METOPROLOL TARTRATE 50 MG
2.5 TABLET ORAL EVERY 6 HOURS
Refills: 0 | Status: DISCONTINUED | OUTPATIENT
Start: 2021-01-01 | End: 2021-01-01

## 2021-01-01 RX ORDER — HYDRALAZINE HCL 50 MG
10 TABLET ORAL EVERY 6 HOURS
Refills: 0 | Status: DISCONTINUED | OUTPATIENT
Start: 2021-01-01 | End: 2021-01-01

## 2021-01-01 RX ORDER — HYDROMORPHONE HYDROCHLORIDE 2 MG/ML
0.5 INJECTION INTRAMUSCULAR; INTRAVENOUS; SUBCUTANEOUS EVERY 6 HOURS
Refills: 0 | Status: DISCONTINUED | OUTPATIENT
Start: 2021-01-01 | End: 2021-01-01

## 2021-01-01 RX ORDER — METOPROLOL TARTRATE 50 MG
2.5 TABLET ORAL ONCE
Refills: 0 | Status: COMPLETED | OUTPATIENT
Start: 2021-01-01 | End: 2021-01-01

## 2021-01-01 RX ORDER — HEPARIN SODIUM 5000 [USP'U]/ML
5000 INJECTION INTRAVENOUS; SUBCUTANEOUS EVERY 12 HOURS
Refills: 0 | Status: DISCONTINUED | OUTPATIENT
Start: 2021-01-01 | End: 2021-01-01

## 2021-01-01 RX ORDER — HYDRALAZINE HCL 50 MG
5 TABLET ORAL EVERY 6 HOURS
Refills: 0 | Status: DISCONTINUED | OUTPATIENT
Start: 2021-01-01 | End: 2021-01-01

## 2021-01-01 RX ORDER — HYDROMORPHONE HYDROCHLORIDE 2 MG/ML
0.2 INJECTION INTRAMUSCULAR; INTRAVENOUS; SUBCUTANEOUS EVERY 4 HOURS
Refills: 0 | Status: DISCONTINUED | OUTPATIENT
Start: 2021-01-01 | End: 2021-01-01

## 2021-01-01 RX ORDER — ACETAMINOPHEN 500 MG
650 TABLET ORAL EVERY 8 HOURS
Refills: 0 | Status: DISCONTINUED | OUTPATIENT
Start: 2021-01-01 | End: 2021-01-01

## 2021-01-01 RX ORDER — ROBINUL 0.2 MG/ML
0.1 INJECTION INTRAMUSCULAR; INTRAVENOUS EVERY 4 HOURS
Refills: 0 | Status: DISCONTINUED | OUTPATIENT
Start: 2021-01-01 | End: 2021-01-01

## 2021-01-01 RX ORDER — SODIUM BICARBONATE 1 MEQ/ML
650 SYRINGE (ML) INTRAVENOUS EVERY 8 HOURS
Refills: 0 | Status: DISCONTINUED | OUTPATIENT
Start: 2021-01-01 | End: 2021-01-01

## 2021-01-01 RX ORDER — HYDROMORPHONE HYDROCHLORIDE 2 MG/ML
1 INJECTION INTRAMUSCULAR; INTRAVENOUS; SUBCUTANEOUS EVERY 6 HOURS
Refills: 0 | Status: DISCONTINUED | OUTPATIENT
Start: 2021-01-01 | End: 2021-01-01

## 2021-01-01 RX ORDER — IRON SUCROSE 20 MG/ML
100 INJECTION, SOLUTION INTRAVENOUS EVERY 24 HOURS
Refills: 0 | Status: COMPLETED | OUTPATIENT
Start: 2021-01-01 | End: 2021-01-01

## 2021-01-01 RX ORDER — ROBINUL 0.2 MG/ML
0.4 INJECTION INTRAMUSCULAR; INTRAVENOUS EVERY 4 HOURS
Refills: 0 | Status: DISCONTINUED | OUTPATIENT
Start: 2021-01-01 | End: 2021-01-01

## 2021-01-01 RX ORDER — SODIUM CHLORIDE 9 MG/ML
500 INJECTION INTRAMUSCULAR; INTRAVENOUS; SUBCUTANEOUS ONCE
Refills: 0 | Status: COMPLETED | OUTPATIENT
Start: 2021-01-01 | End: 2021-01-01

## 2021-01-01 RX ORDER — ACETAMINOPHEN 500 MG
1000 TABLET ORAL ONCE
Refills: 0 | Status: DISCONTINUED | OUTPATIENT
Start: 2021-01-01 | End: 2021-01-01

## 2021-01-01 RX ORDER — PIPERACILLIN AND TAZOBACTAM 4; .5 G/20ML; G/20ML
3.38 INJECTION, POWDER, LYOPHILIZED, FOR SOLUTION INTRAVENOUS EVERY 12 HOURS
Refills: 0 | Status: COMPLETED | OUTPATIENT
Start: 2021-01-01 | End: 2021-01-01

## 2021-01-01 RX ORDER — CEFTRIAXONE 500 MG/1
1000 INJECTION, POWDER, FOR SOLUTION INTRAMUSCULAR; INTRAVENOUS EVERY 24 HOURS
Refills: 0 | Status: COMPLETED | OUTPATIENT
Start: 2021-01-01 | End: 2021-01-01

## 2021-01-01 RX ORDER — POTASSIUM CHLORIDE 20 MEQ
40 PACKET (EA) ORAL ONCE
Refills: 0 | Status: COMPLETED | OUTPATIENT
Start: 2021-01-01 | End: 2021-01-01

## 2021-01-01 RX ORDER — CEFPODOXIME PROXETIL 100 MG
1 TABLET ORAL
Qty: 14 | Refills: 0
Start: 2021-01-01 | End: 2021-01-01

## 2021-01-01 RX ORDER — HYDROMORPHONE HYDROCHLORIDE 2 MG/ML
0.2 INJECTION INTRAMUSCULAR; INTRAVENOUS; SUBCUTANEOUS EVERY 8 HOURS
Refills: 0 | Status: DISCONTINUED | OUTPATIENT
Start: 2021-01-01 | End: 2021-01-01

## 2021-01-01 RX ORDER — HYDROMORPHONE HYDROCHLORIDE 2 MG/ML
0.2 INJECTION INTRAMUSCULAR; INTRAVENOUS; SUBCUTANEOUS
Refills: 0 | Status: DISCONTINUED | OUTPATIENT
Start: 2021-01-01 | End: 2021-01-01

## 2021-01-01 RX ORDER — HYDROMORPHONE HYDROCHLORIDE 2 MG/ML
0.5 INJECTION INTRAMUSCULAR; INTRAVENOUS; SUBCUTANEOUS ONCE
Refills: 0 | Status: DISCONTINUED | OUTPATIENT
Start: 2021-01-01 | End: 2021-01-01

## 2021-01-01 RX ORDER — SODIUM CHLORIDE 9 MG/ML
1000 INJECTION INTRAMUSCULAR; INTRAVENOUS; SUBCUTANEOUS ONCE
Refills: 0 | Status: COMPLETED | OUTPATIENT
Start: 2021-01-01 | End: 2021-01-01

## 2021-01-01 RX ORDER — SODIUM CHLORIDE 9 MG/ML
1000 INJECTION INTRAMUSCULAR; INTRAVENOUS; SUBCUTANEOUS
Refills: 0 | Status: DISCONTINUED | OUTPATIENT
Start: 2021-01-01 | End: 2021-01-01

## 2021-01-01 RX ORDER — HYDROMORPHONE HYDROCHLORIDE 2 MG/ML
1 INJECTION INTRAMUSCULAR; INTRAVENOUS; SUBCUTANEOUS
Refills: 0 | Status: DISCONTINUED | OUTPATIENT
Start: 2021-01-01 | End: 2021-01-01

## 2021-01-01 RX ORDER — METOPROLOL TARTRATE 50 MG
5 TABLET ORAL EVERY 6 HOURS
Refills: 0 | Status: DISCONTINUED | OUTPATIENT
Start: 2021-01-01 | End: 2021-01-01

## 2021-01-01 RX ORDER — HYDROMORPHONE HYDROCHLORIDE 2 MG/ML
0.2 INJECTION INTRAMUSCULAR; INTRAVENOUS; SUBCUTANEOUS EVERY 6 HOURS
Refills: 0 | Status: DISCONTINUED | OUTPATIENT
Start: 2021-01-01 | End: 2021-01-01

## 2021-01-01 RX ORDER — AMLODIPINE BESYLATE 2.5 MG/1
5 TABLET ORAL DAILY
Refills: 0 | Status: DISCONTINUED | OUTPATIENT
Start: 2021-01-01 | End: 2021-01-01

## 2021-01-01 RX ORDER — HYDROMORPHONE HYDROCHLORIDE 2 MG/ML
0.5 INJECTION INTRAMUSCULAR; INTRAVENOUS; SUBCUTANEOUS
Refills: 0 | Status: DISCONTINUED | OUTPATIENT
Start: 2021-01-01 | End: 2021-01-01

## 2021-01-01 RX ADMIN — Medication 200 MILLIGRAM(S): at 17:08

## 2021-01-01 RX ADMIN — SODIUM CHLORIDE 50 MILLILITER(S): 9 INJECTION, SOLUTION INTRAVENOUS at 22:21

## 2021-01-01 RX ADMIN — HYDROMORPHONE HYDROCHLORIDE 0.2 MILLIGRAM(S): 2 INJECTION INTRAMUSCULAR; INTRAVENOUS; SUBCUTANEOUS at 19:35

## 2021-01-01 RX ADMIN — Medication 650 MILLIGRAM(S): at 17:17

## 2021-01-01 RX ADMIN — Medication 5 MILLIGRAM(S): at 05:53

## 2021-01-01 RX ADMIN — Medication 500 MILLIGRAM(S): at 18:06

## 2021-01-01 RX ADMIN — Medication 0.5 MILLIGRAM(S): at 05:17

## 2021-01-01 RX ADMIN — SODIUM CHLORIDE 50 MILLILITER(S): 9 INJECTION, SOLUTION INTRAVENOUS at 11:30

## 2021-01-01 RX ADMIN — HEPARIN SODIUM 5000 UNIT(S): 5000 INJECTION INTRAVENOUS; SUBCUTANEOUS at 17:37

## 2021-01-01 RX ADMIN — HYDROMORPHONE HYDROCHLORIDE 0.5 MILLIGRAM(S): 2 INJECTION INTRAMUSCULAR; INTRAVENOUS; SUBCUTANEOUS at 12:20

## 2021-01-01 RX ADMIN — HYDROMORPHONE HYDROCHLORIDE 1 MILLIGRAM(S): 2 INJECTION INTRAMUSCULAR; INTRAVENOUS; SUBCUTANEOUS at 16:19

## 2021-01-01 RX ADMIN — PIPERACILLIN AND TAZOBACTAM 25 GRAM(S): 4; .5 INJECTION, POWDER, LYOPHILIZED, FOR SOLUTION INTRAVENOUS at 04:16

## 2021-01-01 RX ADMIN — HYDROMORPHONE HYDROCHLORIDE 0.5 MILLIGRAM(S): 2 INJECTION INTRAMUSCULAR; INTRAVENOUS; SUBCUTANEOUS at 00:01

## 2021-01-01 RX ADMIN — PIPERACILLIN AND TAZOBACTAM 25 GRAM(S): 4; .5 INJECTION, POWDER, LYOPHILIZED, FOR SOLUTION INTRAVENOUS at 06:19

## 2021-01-01 RX ADMIN — SODIUM CHLORIDE 75 MILLILITER(S): 9 INJECTION, SOLUTION INTRAVENOUS at 18:30

## 2021-01-01 RX ADMIN — HYDROMORPHONE HYDROCHLORIDE 0.5 MILLIGRAM(S): 2 INJECTION INTRAMUSCULAR; INTRAVENOUS; SUBCUTANEOUS at 08:30

## 2021-01-01 RX ADMIN — Medication 650 MILLIGRAM(S): at 23:49

## 2021-01-01 RX ADMIN — HEPARIN SODIUM 5000 UNIT(S): 5000 INJECTION INTRAVENOUS; SUBCUTANEOUS at 05:44

## 2021-01-01 RX ADMIN — SODIUM CHLORIDE 75 MILLILITER(S): 9 INJECTION, SOLUTION INTRAVENOUS at 08:36

## 2021-01-01 RX ADMIN — HYDROMORPHONE HYDROCHLORIDE 1 MILLIGRAM(S): 2 INJECTION INTRAMUSCULAR; INTRAVENOUS; SUBCUTANEOUS at 12:14

## 2021-01-01 RX ADMIN — SODIUM CHLORIDE 125 MILLILITER(S): 9 INJECTION, SOLUTION INTRAVENOUS at 16:39

## 2021-01-01 RX ADMIN — HEPARIN SODIUM 5000 UNIT(S): 5000 INJECTION INTRAVENOUS; SUBCUTANEOUS at 05:10

## 2021-01-01 RX ADMIN — PIPERACILLIN AND TAZOBACTAM 25 GRAM(S): 4; .5 INJECTION, POWDER, LYOPHILIZED, FOR SOLUTION INTRAVENOUS at 06:51

## 2021-01-01 RX ADMIN — SODIUM CHLORIDE 50 MILLILITER(S): 9 INJECTION, SOLUTION INTRAVENOUS at 03:04

## 2021-01-01 RX ADMIN — HYDROMORPHONE HYDROCHLORIDE 1 MILLIGRAM(S): 2 INJECTION INTRAMUSCULAR; INTRAVENOUS; SUBCUTANEOUS at 16:04

## 2021-01-01 RX ADMIN — Medication 260 MILLIGRAM(S): at 04:11

## 2021-01-01 RX ADMIN — IRON SUCROSE 210 MILLIGRAM(S): 20 INJECTION, SOLUTION INTRAVENOUS at 15:02

## 2021-01-01 RX ADMIN — HYDROMORPHONE HYDROCHLORIDE 0.5 MILLIGRAM(S): 2 INJECTION INTRAMUSCULAR; INTRAVENOUS; SUBCUTANEOUS at 06:32

## 2021-01-01 RX ADMIN — HYDROMORPHONE HYDROCHLORIDE 0.5 MILLIGRAM(S): 2 INJECTION INTRAMUSCULAR; INTRAVENOUS; SUBCUTANEOUS at 17:23

## 2021-01-01 RX ADMIN — SODIUM CHLORIDE 80 MILLILITER(S): 9 INJECTION, SOLUTION INTRAVENOUS at 12:16

## 2021-01-01 RX ADMIN — Medication 10 MILLIGRAM(S): at 16:08

## 2021-01-01 RX ADMIN — Medication 100 MILLIEQUIVALENT(S): at 05:18

## 2021-01-01 RX ADMIN — SODIUM CHLORIDE 75 MILLILITER(S): 9 INJECTION, SOLUTION INTRAVENOUS at 13:36

## 2021-01-01 RX ADMIN — HYDROMORPHONE HYDROCHLORIDE 1 MILLIGRAM(S): 2 INJECTION INTRAMUSCULAR; INTRAVENOUS; SUBCUTANEOUS at 23:28

## 2021-01-01 RX ADMIN — HYDROMORPHONE HYDROCHLORIDE 1 MILLIGRAM(S): 2 INJECTION INTRAMUSCULAR; INTRAVENOUS; SUBCUTANEOUS at 17:23

## 2021-01-01 RX ADMIN — HYDROMORPHONE HYDROCHLORIDE 0.2 MILLIGRAM(S): 2 INJECTION INTRAMUSCULAR; INTRAVENOUS; SUBCUTANEOUS at 08:45

## 2021-01-01 RX ADMIN — Medication 5 MILLIGRAM(S): at 16:37

## 2021-01-01 RX ADMIN — Medication 100 MILLIEQUIVALENT(S): at 18:18

## 2021-01-01 RX ADMIN — ROBINUL 0.1 MILLIGRAM(S): 0.2 INJECTION INTRAMUSCULAR; INTRAVENOUS at 17:22

## 2021-01-01 RX ADMIN — HYDROMORPHONE HYDROCHLORIDE 0.2 MILLIGRAM(S): 2 INJECTION INTRAMUSCULAR; INTRAVENOUS; SUBCUTANEOUS at 05:03

## 2021-01-01 RX ADMIN — Medication 10 MILLIGRAM(S): at 10:33

## 2021-01-01 RX ADMIN — HEPARIN SODIUM 5000 UNIT(S): 5000 INJECTION INTRAVENOUS; SUBCUTANEOUS at 17:11

## 2021-01-01 RX ADMIN — Medication 10 MILLIGRAM(S): at 11:27

## 2021-01-01 RX ADMIN — Medication 260 MILLIGRAM(S): at 17:14

## 2021-01-01 RX ADMIN — HYDROMORPHONE HYDROCHLORIDE 0.5 MILLIGRAM(S): 2 INJECTION INTRAMUSCULAR; INTRAVENOUS; SUBCUTANEOUS at 01:18

## 2021-01-01 RX ADMIN — HYDROMORPHONE HYDROCHLORIDE 0.5 MILLIGRAM(S): 2 INJECTION INTRAMUSCULAR; INTRAVENOUS; SUBCUTANEOUS at 03:24

## 2021-01-01 RX ADMIN — HYDROMORPHONE HYDROCHLORIDE 0.5 MILLIGRAM(S): 2 INJECTION INTRAMUSCULAR; INTRAVENOUS; SUBCUTANEOUS at 06:19

## 2021-01-01 RX ADMIN — Medication 260 MILLIGRAM(S): at 23:32

## 2021-01-01 RX ADMIN — PIPERACILLIN AND TAZOBACTAM 25 GRAM(S): 4; .5 INJECTION, POWDER, LYOPHILIZED, FOR SOLUTION INTRAVENOUS at 17:21

## 2021-01-01 RX ADMIN — Medication 650 MILLIGRAM(S): at 19:00

## 2021-01-01 RX ADMIN — IRON SUCROSE 210 MILLIGRAM(S): 20 INJECTION, SOLUTION INTRAVENOUS at 14:52

## 2021-01-01 RX ADMIN — SODIUM CHLORIDE 75 MILLILITER(S): 9 INJECTION, SOLUTION INTRAVENOUS at 21:16

## 2021-01-01 RX ADMIN — Medication 10 MILLIGRAM(S): at 09:34

## 2021-01-01 RX ADMIN — ROBINUL 0.4 MILLIGRAM(S): 0.2 INJECTION INTRAMUSCULAR; INTRAVENOUS at 17:23

## 2021-01-01 RX ADMIN — SODIUM CHLORIDE 1000 MILLILITER(S): 9 INJECTION INTRAMUSCULAR; INTRAVENOUS; SUBCUTANEOUS at 21:02

## 2021-01-01 RX ADMIN — Medication 10 MILLIGRAM(S): at 22:06

## 2021-01-01 RX ADMIN — Medication 10 MILLIGRAM(S): at 17:22

## 2021-01-01 RX ADMIN — HEPARIN SODIUM 5000 UNIT(S): 5000 INJECTION INTRAVENOUS; SUBCUTANEOUS at 16:46

## 2021-01-01 RX ADMIN — HYDROMORPHONE HYDROCHLORIDE 0.2 MILLIGRAM(S): 2 INJECTION INTRAMUSCULAR; INTRAVENOUS; SUBCUTANEOUS at 01:20

## 2021-01-01 RX ADMIN — PIPERACILLIN AND TAZOBACTAM 200 GRAM(S): 4; .5 INJECTION, POWDER, LYOPHILIZED, FOR SOLUTION INTRAVENOUS at 18:21

## 2021-01-01 RX ADMIN — Medication 2.5 MILLIGRAM(S): at 17:34

## 2021-01-01 RX ADMIN — Medication 5 MILLIGRAM(S): at 21:16

## 2021-01-01 RX ADMIN — Medication 100 MILLIEQUIVALENT(S): at 02:46

## 2021-01-01 RX ADMIN — Medication 260 MILLIGRAM(S): at 18:29

## 2021-01-01 RX ADMIN — Medication 650 MILLIGRAM(S): at 21:50

## 2021-01-01 RX ADMIN — HYDROMORPHONE HYDROCHLORIDE 0.5 MILLIGRAM(S): 2 INJECTION INTRAMUSCULAR; INTRAVENOUS; SUBCUTANEOUS at 08:16

## 2021-01-01 RX ADMIN — SODIUM CHLORIDE 50 MILLILITER(S): 9 INJECTION INTRAMUSCULAR; INTRAVENOUS; SUBCUTANEOUS at 23:10

## 2021-01-01 RX ADMIN — SODIUM CHLORIDE 500 MILLILITER(S): 9 INJECTION INTRAMUSCULAR; INTRAVENOUS; SUBCUTANEOUS at 16:17

## 2021-01-01 RX ADMIN — HYDROMORPHONE HYDROCHLORIDE 1 MILLIGRAM(S): 2 INJECTION INTRAMUSCULAR; INTRAVENOUS; SUBCUTANEOUS at 06:33

## 2021-01-01 RX ADMIN — HYDROMORPHONE HYDROCHLORIDE 0.5 MILLIGRAM(S): 2 INJECTION INTRAMUSCULAR; INTRAVENOUS; SUBCUTANEOUS at 11:56

## 2021-01-01 RX ADMIN — Medication 10 MILLIGRAM(S): at 22:18

## 2021-01-01 RX ADMIN — SODIUM CHLORIDE 1000 MILLILITER(S): 9 INJECTION INTRAMUSCULAR; INTRAVENOUS; SUBCUTANEOUS at 20:15

## 2021-01-01 RX ADMIN — Medication 100 MILLIGRAM(S): at 22:07

## 2021-01-01 RX ADMIN — HEPARIN SODIUM 5000 UNIT(S): 5000 INJECTION INTRAVENOUS; SUBCUTANEOUS at 16:09

## 2021-01-01 RX ADMIN — CEFTRIAXONE 100 MILLIGRAM(S): 500 INJECTION, POWDER, FOR SOLUTION INTRAMUSCULAR; INTRAVENOUS at 17:36

## 2021-01-01 RX ADMIN — HYDROMORPHONE HYDROCHLORIDE 1 MILLIGRAM(S): 2 INJECTION INTRAMUSCULAR; INTRAVENOUS; SUBCUTANEOUS at 23:38

## 2021-01-01 RX ADMIN — HYDROMORPHONE HYDROCHLORIDE 0.5 MILLIGRAM(S): 2 INJECTION INTRAMUSCULAR; INTRAVENOUS; SUBCUTANEOUS at 21:07

## 2021-01-01 RX ADMIN — SODIUM CHLORIDE 1000 MILLILITER(S): 9 INJECTION INTRAMUSCULAR; INTRAVENOUS; SUBCUTANEOUS at 17:18

## 2021-01-01 RX ADMIN — HYDROMORPHONE HYDROCHLORIDE 1 MILLIGRAM(S): 2 INJECTION INTRAMUSCULAR; INTRAVENOUS; SUBCUTANEOUS at 10:30

## 2021-01-01 RX ADMIN — CEFTRIAXONE 1000 MILLIGRAM(S): 500 INJECTION, POWDER, FOR SOLUTION INTRAMUSCULAR; INTRAVENOUS at 20:15

## 2021-01-01 RX ADMIN — HYDROMORPHONE HYDROCHLORIDE 0.5 MILLIGRAM(S): 2 INJECTION INTRAMUSCULAR; INTRAVENOUS; SUBCUTANEOUS at 21:22

## 2021-01-01 RX ADMIN — PIPERACILLIN AND TAZOBACTAM 25 GRAM(S): 4; .5 INJECTION, POWDER, LYOPHILIZED, FOR SOLUTION INTRAVENOUS at 18:01

## 2021-01-01 RX ADMIN — SODIUM CHLORIDE 75 MILLILITER(S): 9 INJECTION, SOLUTION INTRAVENOUS at 03:52

## 2021-01-01 RX ADMIN — HYDROMORPHONE HYDROCHLORIDE 0.2 MILLIGRAM(S): 2 INJECTION INTRAMUSCULAR; INTRAVENOUS; SUBCUTANEOUS at 15:17

## 2021-01-01 RX ADMIN — HEPARIN SODIUM 5000 UNIT(S): 5000 INJECTION INTRAVENOUS; SUBCUTANEOUS at 15:50

## 2021-01-01 RX ADMIN — Medication 650 MILLIGRAM(S): at 05:03

## 2021-01-01 RX ADMIN — HYDROMORPHONE HYDROCHLORIDE 0.5 MILLIGRAM(S): 2 INJECTION INTRAMUSCULAR; INTRAVENOUS; SUBCUTANEOUS at 03:39

## 2021-01-01 RX ADMIN — Medication 650 MILLIGRAM(S): at 13:13

## 2021-01-01 RX ADMIN — SODIUM CHLORIDE 80 MILLILITER(S): 9 INJECTION, SOLUTION INTRAVENOUS at 10:10

## 2021-01-01 RX ADMIN — CEFTRIAXONE 100 MILLIGRAM(S): 500 INJECTION, POWDER, FOR SOLUTION INTRAMUSCULAR; INTRAVENOUS at 18:31

## 2021-01-01 RX ADMIN — HYDROMORPHONE HYDROCHLORIDE 0.5 MILLIGRAM(S): 2 INJECTION INTRAMUSCULAR; INTRAVENOUS; SUBCUTANEOUS at 18:01

## 2021-01-01 RX ADMIN — HYDROMORPHONE HYDROCHLORIDE 1 MILLIGRAM(S): 2 INJECTION INTRAMUSCULAR; INTRAVENOUS; SUBCUTANEOUS at 14:42

## 2021-01-01 RX ADMIN — Medication 100 MILLIEQUIVALENT(S): at 03:52

## 2021-01-01 RX ADMIN — Medication 2.5 MILLIGRAM(S): at 09:00

## 2021-01-01 RX ADMIN — Medication 400 MILLIGRAM(S): at 18:44

## 2021-01-01 RX ADMIN — HEPARIN SODIUM 5000 UNIT(S): 5000 INJECTION INTRAVENOUS; SUBCUTANEOUS at 06:55

## 2021-01-01 RX ADMIN — Medication 2.5 MILLIGRAM(S): at 22:21

## 2021-01-01 RX ADMIN — HYDROMORPHONE HYDROCHLORIDE 0.2 MILLIGRAM(S): 2 INJECTION INTRAMUSCULAR; INTRAVENOUS; SUBCUTANEOUS at 06:51

## 2021-01-01 RX ADMIN — HYDROMORPHONE HYDROCHLORIDE 0.2 MILLIGRAM(S): 2 INJECTION INTRAMUSCULAR; INTRAVENOUS; SUBCUTANEOUS at 15:40

## 2021-01-01 RX ADMIN — Medication 100 MILLIEQUIVALENT(S): at 16:22

## 2021-01-01 RX ADMIN — Medication 260 MILLIGRAM(S): at 05:53

## 2021-01-01 RX ADMIN — SODIUM CHLORIDE 75 MILLILITER(S): 9 INJECTION, SOLUTION INTRAVENOUS at 20:41

## 2021-01-01 RX ADMIN — Medication 10 MILLIGRAM(S): at 22:02

## 2021-01-01 RX ADMIN — Medication 100 MILLIEQUIVALENT(S): at 17:23

## 2021-01-01 RX ADMIN — PIPERACILLIN AND TAZOBACTAM 25 GRAM(S): 4; .5 INJECTION, POWDER, LYOPHILIZED, FOR SOLUTION INTRAVENOUS at 18:35

## 2021-01-01 RX ADMIN — SODIUM CHLORIDE 150 MILLILITER(S): 9 INJECTION, SOLUTION INTRAVENOUS at 10:33

## 2021-01-01 RX ADMIN — SODIUM CHLORIDE 50 MILLILITER(S): 9 INJECTION, SOLUTION INTRAVENOUS at 11:11

## 2021-01-01 RX ADMIN — HEPARIN SODIUM 5000 UNIT(S): 5000 INJECTION INTRAVENOUS; SUBCUTANEOUS at 05:06

## 2021-01-01 RX ADMIN — HYDROMORPHONE HYDROCHLORIDE 0.5 MILLIGRAM(S): 2 INJECTION INTRAMUSCULAR; INTRAVENOUS; SUBCUTANEOUS at 15:49

## 2021-01-01 RX ADMIN — HEPARIN SODIUM 5000 UNIT(S): 5000 INJECTION INTRAVENOUS; SUBCUTANEOUS at 17:28

## 2021-01-01 RX ADMIN — SODIUM CHLORIDE 150 MILLILITER(S): 9 INJECTION, SOLUTION INTRAVENOUS at 08:59

## 2021-01-01 RX ADMIN — PIPERACILLIN AND TAZOBACTAM 25 GRAM(S): 4; .5 INJECTION, POWDER, LYOPHILIZED, FOR SOLUTION INTRAVENOUS at 19:35

## 2021-01-01 RX ADMIN — Medication 10 MILLIGRAM(S): at 22:29

## 2021-01-01 RX ADMIN — HYDROMORPHONE HYDROCHLORIDE 0.2 MILLIGRAM(S): 2 INJECTION INTRAMUSCULAR; INTRAVENOUS; SUBCUTANEOUS at 01:05

## 2021-01-01 RX ADMIN — CEFTRIAXONE 100 MILLIGRAM(S): 500 INJECTION, POWDER, FOR SOLUTION INTRAMUSCULAR; INTRAVENOUS at 17:55

## 2021-01-01 RX ADMIN — IRON SUCROSE 210 MILLIGRAM(S): 20 INJECTION, SOLUTION INTRAVENOUS at 14:00

## 2021-01-01 RX ADMIN — Medication 5 MILLIGRAM(S): at 11:11

## 2021-01-01 RX ADMIN — CEFTRIAXONE 100 MILLIGRAM(S): 500 INJECTION, POWDER, FOR SOLUTION INTRAMUSCULAR; INTRAVENOUS at 18:10

## 2021-01-01 RX ADMIN — Medication 650 MILLIGRAM(S): at 18:00

## 2021-01-01 RX ADMIN — PIPERACILLIN AND TAZOBACTAM 25 GRAM(S): 4; .5 INJECTION, POWDER, LYOPHILIZED, FOR SOLUTION INTRAVENOUS at 17:24

## 2021-01-01 RX ADMIN — PIPERACILLIN AND TAZOBACTAM 25 GRAM(S): 4; .5 INJECTION, POWDER, LYOPHILIZED, FOR SOLUTION INTRAVENOUS at 17:55

## 2021-01-01 RX ADMIN — Medication 650 MILLIGRAM(S): at 06:30

## 2021-01-01 RX ADMIN — HYDROMORPHONE HYDROCHLORIDE 0.5 MILLIGRAM(S): 2 INJECTION INTRAMUSCULAR; INTRAVENOUS; SUBCUTANEOUS at 16:10

## 2021-01-01 RX ADMIN — SODIUM CHLORIDE 75 MILLILITER(S): 9 INJECTION, SOLUTION INTRAVENOUS at 17:55

## 2021-01-01 RX ADMIN — HYDROMORPHONE HYDROCHLORIDE 0.2 MILLIGRAM(S): 2 INJECTION INTRAMUSCULAR; INTRAVENOUS; SUBCUTANEOUS at 09:00

## 2021-01-01 RX ADMIN — PIPERACILLIN AND TAZOBACTAM 25 GRAM(S): 4; .5 INJECTION, POWDER, LYOPHILIZED, FOR SOLUTION INTRAVENOUS at 05:39

## 2021-01-01 RX ADMIN — Medication 10 MILLIGRAM(S): at 10:00

## 2021-01-01 RX ADMIN — HYDROMORPHONE HYDROCHLORIDE 0.2 MILLIGRAM(S): 2 INJECTION INTRAMUSCULAR; INTRAVENOUS; SUBCUTANEOUS at 13:13

## 2021-01-01 RX ADMIN — HEPARIN SODIUM 5000 UNIT(S): 5000 INJECTION INTRAVENOUS; SUBCUTANEOUS at 06:25

## 2021-01-01 RX ADMIN — HEPARIN SODIUM 5000 UNIT(S): 5000 INJECTION INTRAVENOUS; SUBCUTANEOUS at 04:19

## 2021-01-01 RX ADMIN — Medication 10 MILLIGRAM(S): at 09:48

## 2021-01-01 RX ADMIN — HEPARIN SODIUM 5000 UNIT(S): 5000 INJECTION INTRAVENOUS; SUBCUTANEOUS at 04:15

## 2021-01-01 RX ADMIN — HEPARIN SODIUM 5000 UNIT(S): 5000 INJECTION INTRAVENOUS; SUBCUTANEOUS at 05:36

## 2021-01-01 RX ADMIN — PIPERACILLIN AND TAZOBACTAM 25 GRAM(S): 4; .5 INJECTION, POWDER, LYOPHILIZED, FOR SOLUTION INTRAVENOUS at 05:10

## 2021-01-01 RX ADMIN — HYDROMORPHONE HYDROCHLORIDE 1 MILLIGRAM(S): 2 INJECTION INTRAMUSCULAR; INTRAVENOUS; SUBCUTANEOUS at 06:47

## 2021-01-01 RX ADMIN — HYDROMORPHONE HYDROCHLORIDE 0.2 MILLIGRAM(S): 2 INJECTION INTRAMUSCULAR; INTRAVENOUS; SUBCUTANEOUS at 03:17

## 2021-01-01 RX ADMIN — HEPARIN SODIUM 5000 UNIT(S): 5000 INJECTION INTRAVENOUS; SUBCUTANEOUS at 17:57

## 2021-01-01 RX ADMIN — Medication 2.5 MILLIGRAM(S): at 00:31

## 2021-01-01 RX ADMIN — PIPERACILLIN AND TAZOBACTAM 25 GRAM(S): 4; .5 INJECTION, POWDER, LYOPHILIZED, FOR SOLUTION INTRAVENOUS at 05:03

## 2021-01-01 RX ADMIN — PIPERACILLIN AND TAZOBACTAM 25 GRAM(S): 4; .5 INJECTION, POWDER, LYOPHILIZED, FOR SOLUTION INTRAVENOUS at 17:11

## 2021-01-01 RX ADMIN — SODIUM CHLORIDE 150 MILLILITER(S): 9 INJECTION, SOLUTION INTRAVENOUS at 05:37

## 2021-01-01 RX ADMIN — HEPARIN SODIUM 5000 UNIT(S): 5000 INJECTION INTRAVENOUS; SUBCUTANEOUS at 17:54

## 2021-01-01 RX ADMIN — HYDROMORPHONE HYDROCHLORIDE 0.2 MILLIGRAM(S): 2 INJECTION INTRAMUSCULAR; INTRAVENOUS; SUBCUTANEOUS at 11:58

## 2021-01-01 RX ADMIN — HYDROMORPHONE HYDROCHLORIDE 1 MILLIGRAM(S): 2 INJECTION INTRAMUSCULAR; INTRAVENOUS; SUBCUTANEOUS at 18:51

## 2021-01-01 RX ADMIN — HYDROMORPHONE HYDROCHLORIDE 1 MILLIGRAM(S): 2 INJECTION INTRAMUSCULAR; INTRAVENOUS; SUBCUTANEOUS at 14:55

## 2021-01-01 RX ADMIN — Medication 650 MILLIGRAM(S): at 04:26

## 2021-01-01 RX ADMIN — Medication 5 MILLIGRAM(S): at 04:20

## 2021-01-01 RX ADMIN — HYDROMORPHONE HYDROCHLORIDE 0.2 MILLIGRAM(S): 2 INJECTION INTRAMUSCULAR; INTRAVENOUS; SUBCUTANEOUS at 03:02

## 2021-01-01 RX ADMIN — Medication 5 MILLIGRAM(S): at 10:11

## 2021-01-01 RX ADMIN — HYDROMORPHONE HYDROCHLORIDE 1 MILLIGRAM(S): 2 INJECTION INTRAMUSCULAR; INTRAVENOUS; SUBCUTANEOUS at 10:45

## 2021-01-01 RX ADMIN — SODIUM CHLORIDE 100 MILLILITER(S): 9 INJECTION, SOLUTION INTRAVENOUS at 10:29

## 2021-01-01 RX ADMIN — HYDROMORPHONE HYDROCHLORIDE 0.2 MILLIGRAM(S): 2 INJECTION INTRAMUSCULAR; INTRAVENOUS; SUBCUTANEOUS at 21:50

## 2021-01-01 RX ADMIN — HYDROMORPHONE HYDROCHLORIDE 0.2 MILLIGRAM(S): 2 INJECTION INTRAMUSCULAR; INTRAVENOUS; SUBCUTANEOUS at 09:08

## 2021-01-01 RX ADMIN — ROBINUL 0.1 MILLIGRAM(S): 0.2 INJECTION INTRAMUSCULAR; INTRAVENOUS at 05:10

## 2021-01-01 RX ADMIN — Medication 10 MILLIGRAM(S): at 09:32

## 2021-01-01 RX ADMIN — HYDROMORPHONE HYDROCHLORIDE 0.5 MILLIGRAM(S): 2 INJECTION INTRAMUSCULAR; INTRAVENOUS; SUBCUTANEOUS at 15:55

## 2021-01-01 RX ADMIN — HEPARIN SODIUM 5000 UNIT(S): 5000 INJECTION INTRAVENOUS; SUBCUTANEOUS at 19:01

## 2021-01-01 RX ADMIN — HYDROMORPHONE HYDROCHLORIDE 0.2 MILLIGRAM(S): 2 INJECTION INTRAMUSCULAR; INTRAVENOUS; SUBCUTANEOUS at 09:49

## 2021-01-01 RX ADMIN — HEPARIN SODIUM 5000 UNIT(S): 5000 INJECTION INTRAVENOUS; SUBCUTANEOUS at 05:19

## 2021-01-01 RX ADMIN — HYDROMORPHONE HYDROCHLORIDE 0.5 MILLIGRAM(S): 2 INJECTION INTRAMUSCULAR; INTRAVENOUS; SUBCUTANEOUS at 11:28

## 2021-01-01 RX ADMIN — Medication 5 MILLIGRAM(S): at 05:07

## 2021-01-01 RX ADMIN — Medication 650 MILLIGRAM(S): at 20:15

## 2021-01-01 RX ADMIN — PIPERACILLIN AND TAZOBACTAM 25 GRAM(S): 4; .5 INJECTION, POWDER, LYOPHILIZED, FOR SOLUTION INTRAVENOUS at 05:40

## 2021-01-01 RX ADMIN — SODIUM CHLORIDE 80 MILLILITER(S): 9 INJECTION, SOLUTION INTRAVENOUS at 22:01

## 2021-01-01 RX ADMIN — HYDROMORPHONE HYDROCHLORIDE 1 MILLIGRAM(S): 2 INJECTION INTRAMUSCULAR; INTRAVENOUS; SUBCUTANEOUS at 12:35

## 2021-01-01 RX ADMIN — HEPARIN SODIUM 5000 UNIT(S): 5000 INJECTION INTRAVENOUS; SUBCUTANEOUS at 19:13

## 2021-01-01 RX ADMIN — HYDROMORPHONE HYDROCHLORIDE 1 MILLIGRAM(S): 2 INJECTION INTRAMUSCULAR; INTRAVENOUS; SUBCUTANEOUS at 12:30

## 2021-05-04 NOTE — ED PROVIDER NOTE - NSFOLLOWUPINSTRUCTIONS_ED_ALL_ED_FT
You were seen in the Emergency Department (ED) for Urinary Tract infection.     You are prescribed Cefpodoxime. Please take as directed by your pharmacists.     Please follow up with your primary care doctor in the next 72 hours.     Please return to the ED if you experience any new or concerning symptoms, such as:   - chest pain  - difficulty breathing  - passing out  - unable to eat or drink  - unable to move or feel part of your body  - fever, chills    Thank you for visiting a [facility] ED.

## 2021-05-04 NOTE — ED PROVIDER NOTE - PATIENT PORTAL LINK FT
You can access the FollowMyHealth Patient Portal offered by [facility] by registering at the following website: [email or web address]. By joining [employer]’s FollowMyHealth portal, you will also be able to view your health information using other applications (apps) compatible with our system.

## 2021-05-04 NOTE — ED PROVIDER NOTE - CLINICAL SUMMARY MEDICAL DECISION MAKING FREE TEXT BOX
89 y/o female with PMhx of Osteoporosis who presented to the ED for fever over the past 2 days with nausea and vomiting and  PO intake.   Concern for COVID/URI/PNA/UTI/Elevated LFTs  Labs, EKG, CXR, UA, IVF

## 2021-05-04 NOTE — ED ADULT NURSE NOTE - OBJECTIVE STATEMENT
Pt w/ hx of osteoporosis c/o abdominal pain NVD x 3 days Pt denies chest pain fever chills, Noted to have elevated LFTs at PCP 18 g IV access obtained at R.forearm labs as ordered

## 2021-05-04 NOTE — ED PROVIDER NOTE - ATTENDING CONTRIBUTION TO CARE
ID# [id number]  Pt was seen and evaluated by me. Pt is a 89 y/o female with PMhx of Osteoporosis who presented to the ED for fever over the past 2 days with nausea and vomiting and  PO intake. Pt was seen by PMD who noted pt had elevated LFTs. Pt denies any SOB, chest pain, or abd pain. Denies any diarrhea or dysuria. Lungs CTA b/l. RRR. Abd soft, non-tender. No focal deficits.   Concern for COVID/URI/PNA/UTI/Elevated LFTs  Labs, EKG, CXR, UA, IVF

## 2021-05-04 NOTE — ED PROVIDER NOTE - OBJECTIVE STATEMENT
ID# [id number]  ID# [id number]  89 y/o female with PMhx of Osteoporosis who presented to the ED for fever over the past 2 days with nausea and vomiting and  PO intake.  ID# [id number]  89 y/o female with PMhx of Osteoporosis who presented to the ED for fever over the past 2 days with nausea and vomiting and  PO intake. Pt was seen by PMD who noted pt had elevated LFTs. Pt denies any SOB, chest pain, or abd pain. Denies any diarrhea or dysuria.

## 2021-05-04 NOTE — ED ADULT TRIAGE NOTE - CHIEF COMPLAINT QUOTE
as per daughter pt with fever for 2 days' and episodes of N.V, poor oral intake, sent in by PMD for r/o COVID, pt denies c/p, SOB, diarrhea, well groomed well nourished in triage, Hx of arthritis, non specific abd eryn, takes multivitamins,

## 2021-05-04 NOTE — ED PROVIDER NOTE - PROGRESS NOTE DETAILS
Rebeka Treviño M.D. Resident  Pt family at bedside, explained to family member that pt has UTI and will need to take ABX and follow up with PMD. Family member expressed understanding and translated information for patient.

## 2021-06-04 PROBLEM — M81.0 AGE-RELATED OSTEOPOROSIS WITHOUT CURRENT PATHOLOGICAL FRACTURE: Chronic | Status: ACTIVE | Noted: 2021-01-01

## 2021-06-04 NOTE — ED PROVIDER NOTE - ATTENDING CONTRIBUTION TO CARE
Attending note:   After face to face evaluation of this patient, I concur with above noted hx, pe, and care plan for this patient.  Krunal: History from daughter with  service. 89 yof with hx of dementia brought in by daughter for failure to thrive. Her mother has not been eating well, not drinking well, coughing and appears to have diff swallowing her own saliva. Pt being fed with syringe at home and not conversing as well as home. Pt had recent UTI and improved with antibiotics. Pt is cachetic, with temporal wasting, but well cared for, dry mucosa, +tenting, clear lungs, tachy, febrile, when yells in pain when abdomen palpated anywhere, no edema, pulses strong distally, appears to move all extremities, not answering any questions by daughter, moaning occ, neck soft and supple. Pt is febrile, altered. Septic. Fluids, antibiotics, cultures, UA, admit.

## 2021-06-04 NOTE — ED PROVIDER NOTE - CARE PLAN
Principal Discharge DX:	UTI (urinary tract infection)  Secondary Diagnosis:	Hypernatremia  Secondary Diagnosis:	TRINH (acute kidney injury)

## 2021-06-04 NOTE — ED PROVIDER NOTE - CLINICAL SUMMARY MEDICAL DECISION MAKING FREE TEXT BOX
89 y/o F w/ PMH of anemia presenting w/ AMS and failure to thrive. Pt chronically ill appearing. Pt w/ abd tenderness on exam. Rectally febrile. Will do infectious and metabolic work up. CTH for AMS. UTI approx 1 month ago, possible recurrent. Will eval for surg abd process w/ CT a/p. Sinus tachycardic, likely 2/2 dehydration and fever. Plan for labs, CTs, IVF, meds PRN. Plan for admission.

## 2021-06-04 NOTE — ED PROVIDER NOTE - IV ALTEPLASE EXCL ABS HIDDEN
Physical Therapy Treatment    ASSESSMENT:   Pt tolerated session well.  Pt is A&Ox4 with consistent command following in Polish.  Denies pain.  Required physical assist for all mobility despite RW; required L platform attachment this session 2^ increased L UE/LE weakness since re-eval last week; RN aware and Dr. Sergio McallisterServed.  Frequent VCs for L heel strike and L LE clearance and constant physical assist for RW navigation 2^ L UE weakness with amb.  Impaired L LE clearance with increasing fatigue.  Session included stair climbing and bed<>w/c transfer training.  Session limited by fatigue.  Reviewed HEP and PT POC; pt verbalized understanding.  Pt is at increased risk for falls 2^ impaired activity tolerance, cognition, motor function, muscle performance, and balance; RN aware.  Would benefit from mobility with L platform RW and RN staff assist; RN aware.  Pt is currently unable to perform all mobility without L platform RW and assistance with amb limited to 15ft and without attempt at stair climbing; PTA, pt reports indep in mobility in home with JESSY where lives with family.  Pt is NOT safe to participate in mobility without assistance at this time.  Upon hospital d/c, recommend 24hr assistance and daily skilled therapies; physiatry on consult.  Will contin to follow while hospitalized to facilitate return to PLOF.     LIZETH Pacheco, PT  pgr [phone]     DIAGNOSIS & PAST MEDICAL HISTORY:      Date Recent Hospital Event   3/3/2020  Cerebral aneurysm [I67.1]  Pt to OSH after being found down in the shower, CT and CTA showed R hemisphere brain mass, suspicious for mets, and R MCA aneurysm => [facility] for evaluation for possible neuroendovascular intervention => [address] [place]   3/91001 S/p R CCA catherization, embolization of right MCA aneurysm => SICU   3/6/2020 Activity as tolerated, advance slowly, assess groin puncture site with first OOB activity  PT/OT orders rec'd => 10T  RRT 2^ R femoral hematoma  => SICU    3/7/2020   New PT orders received: ambulate   3/8/2020 => 10 T   3/9/2020 S/p L supraclavicular LN biopsy   3/16/2020 Physiatry on consult          Therapy Diagnosis: abnormalities of gait and mobitlites    History - past medical             Past Medical History:   Diagnosis Date   • Abdominal aneurysm, ruptured (CMS/HCC)       15 years ago, stent placed 1/23/20   • Essential (primary) hypertension     • Thyroid condition           History - past surgical                 Past Surgical History:   Procedure Laterality Date   • [facility]mc intra-operative monitoring, neurologic   3/5/2020         • Thyroidectomy, partial                  PRIOR LIVING SITUATION & PRIOR LEVEL OF FUNCTION:   Prior Living Situation:  Prior Living Situation  Information Provided By:: Patient  Type of Home: House  Home Layout: One level, Stairs to enter with rails  Lives With: Daughter(son-in-law; grandchildren)  Receives Help From: Family(dtr helps with showering)  Transportation: Family  Bathroom Shower/Tub: Walk-in shower  Bathroom Toilet: Raised  Bathroom Equipment: Grab bars in shower  Bathroom Accessibility: Entry Level  Additional Comments: pt alone during day; dghtr,RANULFO at work,grandkids at school    Prior Communication and Cognition  Prior Communication/Cognition  Prior Cognition: Intact    Prior Level of Function:  Prior Function  Pre-Morbid Modified Sg Scale: Slight disability: unable to carry out all previous activities, but able to look after own affairs without assistance  Eating: Independent  Grooming: Independent  Bathing: Minimal Assist (Min)  Upper Body Dressing: Independent  Lower Body Dressing: Independent  Toileting: Independent  Bed Mobility: Independent  Transfers: Independent  Toilet Transfers: Independent  Toilet Transfer Equipment: Raised toilet seat  Tub/Shower Transfers: Supervision (Supv)  Tub/Shower Transfer Equipment: Grab bar on wall, Tub chair  Ambulation in the Home: Independent  Ambulation in the Community:  Independent  Transfer Equipment: None  Locomotion Equipment: None  History of Falls in past year: No  Prior Homemaking/IADLs: Needs assistance  Meal Prep: Independent  Laundry: (family performs)  Vacuuming: (family performs)  Cleaning: (family performs)  Home Maintenance: (family performs)  Gardening: (family performs)  Other (Comment): (family performs primary house work. pt cooks, occ light duti)     EQUIPMENT:   PT/OT Mobility Equipment for Discharge: hospital bed, w/c, L platform RW (03/17/20 1115)  PT/OT ADL Equipment for Discharge: TBD (03/06/20 0832)     PRECAUTIONS:   Precautions  Other Precautions: fall      PAIN: denies     SUBJECTIVE:   Subjective: \"I need to go to the bathroom.\" (03/17/20 1115)      Orientation Level: Oriented X4  Affect: Cooperative     OBJECTIVE:   Balance:  Standing - Static: Minimal Assist (Min)(w/L platform RW) (03/17/20 1115)    Bed Mobility:    Supine to Sit: Minimal Assist (Min)(with HOB elevated and bedrails) (03/17/20 1115)  Bed Mobility Comments: Pt supine in bed when PT entered room. (03/17/20 1115)     Transfers:    Sit to Stand: Minimal Assist (Min) (03/17/20 1115)  Stand to Sit: Minimal Assist (Min) (03/17/20 1115)  Assistive Device/: 2-wheeled walker(L platform RW) (03/17/20 1115)      Gait:    Gait Assistance: Partial/Moderate Assistance (03/17/20 1115)  Assistive Device/: 2-wheeled walker(w/ L platform attachement) (03/17/20 1115)  Ambulation Distance (Feet): 15 Feet(x2) (03/17/20 1115)  Gait Comments 1: much difficulty advancing L LE (03/17/20 1115)       Stairs:    Number of Stairs: 1 (03/17/20 1115)  Stair Management Assistance: Minimal Assist (Min) (03/17/20 1115)  Stair Management Technique: One rail L;Two hands one rail;Sideways;Step to pattern (03/17/20 1115)       Wheelchair Mobility:  Wheelchair Mobility Comments: not performed (03/17/20 1115)       UE ROM:NT    LE ROM: NT    UE Strength:NT    LE Strength: NT    NEURO:   Neuro  Comments:   L UE/LE hemiparesis     Pre-Morbid Modified Ranken Scale: Slight disability: unable to carry out all previous activities, but able to look after own affairs without assistance (03/12/20 1316)    Modified Ranken Scale: Moderately severe disability: unable to walk without assistance and unable to attend to own bodily needs without assistance (03/17/20 1115)     AM-PAC 6 Clicks Basic Mobility:   AM-PAC 6 Clicks Basic Mobility  Help to turn from back to side (bed flat, no rails): A little  Help to go from supine to sit (bed flat, no rails): A little  Help to move to/from bed to chair: A little  Help to stand up/sit down from chair using UEs: A little  Help to walk in hospital room: A lot  Help to climb 3-5 steps with rail: A lot  Basic Mobility Raw Score: 16  Basic Mobility Converted Score: 38.32     AM-PAC 6 Click Daily Activity:   AM-PAC 6 Clicks Daily Activity  Help needed to eat meals: A little  Help needed for personal grooming : A little  Help needed for bathing : A little  Help needed to don/doff regular upper body clothing: A little  Help needed to don/doff regular lower body clothing: A little  Help needed for toileting: A little  Daily Activity Raw Score: 18  Daily Activity Converted Score: 38.66     AM-PAC 6 CLICK Applied Cognitive:   Understanding 10-15 min speech: None  Follows familiar conversation: None  Remembering to take medication: None  Remembering where things are: None  Remembering 5 errands, no list: None  Taking care of complicated tasks: None  Applied Cognitive Raw Score: 24  Applied Cognitive Converted Score: 62.21     GOALS:   Short Term Goals to Be Reviewed On: 03/26/20 (03/17/20 1115)  Short Term Goals = Discharge Goals: Yes (03/17/20 1115)  Goal Agreement: Patient agrees with goals and treatment plan (03/17/20 1115)  Bed Mobility Discharge Goal: Pt will perform bed mobilities with SUP (03/10/20 1761)  Bed Mobility Discharge Goal Progress: Outcome met, complete goal (03/12/20  1316)  Transfer Discharge Goal: Pt will perform transfers with CGA with LRAD (03/10/20 0935)  Transfer Discharge Goal Progress: Outcome met, complete goal (03/12/20 1316)  Ambulation Discharge Goal: Pt will perform gait training with LRAD with MIN A for 50 ft. (03/17/20 1115)  Ambulation Discharge Goal Progress: Outcome not met, continue to monitor (03/17/20 1115)  Stairs Discharge Goal: Pt to participate in stair climbing (03/17/20 1115)  Stairs Discharge Goal Progress: Outcome met, complete goal (03/17/20 1115)  Other Discharge Goal 1: Pt to ascend/descend 3 stairs with 1 railing and CGA (03/17/20 1115)  Other Discharge Goal 1 Progress: Outcome met, complete goal (03/12/20 1316)  Other Discharge Goal 2: patient will perform transfers safely w/ supervision using AAD to prepare for d/c (03/17/20 1115)  Other Discharge Goal 2 Progress: Outcome not met, continue to monitor (03/17/20 1115)  Additional Goals: Yes (03/12/20 1316)  Other Discharge Goal 3: Pt to perform supine<>sitting EOB mod indep (03/17/20 1115)     PLAN AND RECOMMENDATIONS:   Recommendations for Discharge:  Recommendation for Discharge: PT IL: Patient requires 24 hour nonskilled assistance to perform mobility and/or ADLs safely, Patient needs intensive skilled therapy for a minimum of 3 hours daily by at least two disciplines with the oversight of a physical medicine and rehabilitation physician      Plan:   Further skilled physical therapy services are reasonable and necessary to address the above impairments and performance deficits  Treatment/Interventions: Functional transfer training;Strengthening;Neuromuscular re-education;Safety Education;Stairs retraining;Wheelchair mobility;Compensatory technique education;Gait training;Bed mobility;Patient/Family training;Endurance training;ROM (03/17/20 1115)       PT Frequency: 4 days/week (03/17/20 1115)                      HANDOFF:   At the end of the therapy session, patient is in bedside chair with the  following safety measures in place: alarm system in place/re-engaged, call light within reach, equipment intact, lines intact and RN notified    Patient is located in the patient room and was handed off to RN. Patient’s family was was not present.      show

## 2021-06-04 NOTE — ED ADULT NURSE NOTE - OBJECTIVE STATEMENT
received pt in bed Alert but non verbal, does not attempt to make eye contact during interview, hx of provided by daughter at bedside using  services, as per daughter pt progressively weak, unable to eat or drink for the last week, started coughing when attempting to swallow saliva or food. pt with molted skin color, tenting skin,  dry but intact mucous membranes, lung sounds clear B/l and soft non distended but tender to touch in RLQ and RUQ. on assessment pt with un stageable wound to sacrum measuring 4x3, no exudate. placed on monitor with VS as noted, labs drawn and sent, further orders noted.

## 2021-06-04 NOTE — ED PROVIDER NOTE - OBJECTIVE STATEMENT
89 y/o F w/ PMH of anemia presenting w/ decrease PO intake. 89 y/o F w/ PMH of anemia presenting w/ decrease PO intake. Red room 4. Presents w/ daughter. HPI obtained from daughter. Pt reportedly A&O x1 at baseline (person), not A&O x0. Per daughter, pt has been weaker than usual over the past few days. Has not eaten or drank anything on her own over the past few days. Family has been giving her water with a syringe. Daughter also reports pt has been having episodes of coughing when trying to drink. Per chart review pt was recently seen in this ED and diagnosed w/ UTI. Daughter reports pt completed course of abx and had been doing well since then.

## 2021-06-04 NOTE — ED PROVIDER NOTE - PHYSICAL EXAMINATION
Gen: NAD, AOx0, non toxic, frail appearing  Head: NCAT  HEENT: EOMI, oral mucosa moist, normal conjunctiva  Lung: CTAB, no respiratory distress, no wheezes/rhonchi/rales B/L  CV: Tachycardic, no murmurs  Abd: soft, LUQ and RLQ abd tenderness  MSK: no visible deformities  Neuro: Appears non focal  Skin: Warm, well perfused  Psych: normal affect

## 2021-06-04 NOTE — ED ADULT TRIAGE NOTE - CHIEF COMPLAINT QUOTE
Pt brought in by daughter for dehydration, decreased PO intake since yesterday. Pt nonverbal at baseline per daughter. HR 130s in triage, unable to obtain temp. PMHx anemia

## 2021-06-04 NOTE — ED PROVIDER NOTE - PROGRESS NOTE DETAILS
Dr. Miles Alvarado, PGY-3: pt w/ +UTI. CT w/ findings consistent for cystitis. CTH negative. Hypernatremic. TRINH. Spoke w/ hospitalist who accepted pt

## 2021-06-05 NOTE — H&P ADULT - PROBLEM SELECTOR PLAN 5
Patient with ?AMS vs refusing to answer questions - daughter providing history. States she is aox3 at baseline.   - will give fluid as above  - Treat sepsis/UTI  - S/S evaluation

## 2021-06-05 NOTE — ED ADULT NURSE REASSESSMENT NOTE - NS ED NURSE REASSESS COMMENT FT1
Report given to ESS[facility] 1 MARIAMA Monteiro. Pt resting in bed comfortably in non acute distress. Respirations even and unlabored sating 98% on room air. Non verbal indicators of pain absent. VS as noted..MD Fischer made aware of hypertension

## 2021-06-05 NOTE — H&P ADULT - PROBLEM SELECTOR PLAN 4
Dehydration from decreased PO intake and sepsis  - 4L free water deficity  - will start at 100cc/hr of .45% NaCl  -Check Na in am Dehydration from decreased PO intake and sepsis  - 4L free water deficity  - will start at 50cc/hr of d5  -Check Na q8hrs

## 2021-06-05 NOTE — PROGRESS NOTE ADULT - SUBJECTIVE AND OBJECTIVE BOX
[facility] Division of Hospital Medicine  Lyndsey Cunha DO  Pager (TRICEF, 8A-5P): [phone]      Patient is a 89y old  Female who presents with a chief complaint of Dehydration (2021 01:06)      SUBJECTIVE / OVERNIGHT EVENTS: No acute events overnight. ROS unattainable from patient given cognitive impairment.  used for daughter, Delmy at bedside. ID #[id number]. She states patient has had UTI before but she feels she can understand. She also states she would not want resuscitation if her condition were to get to that point. She would want her comfortable.    MEDICATIONS  (STANDING):  cefTRIAXone   IVPB 1000 milliGRAM(s) IV Intermittent every 24 hours  dextrose 5%. 1000 milliLiter(s) (75 mL/Hr) IV Continuous <Continuous>  heparin   Injectable 5000 Unit(s) SubCutaneous every 12 hours    MEDICATIONS  (PRN):      CAPILLARY BLOOD GLUCOSE        I&O's Summary      PHYSICAL EXAM:  Vital Signs Last 24 Hrs  T(C): 36.5 (2021 11:13), Max: 38 (2021 16:51)  T(F): 97.7 (2021 11:13), Max: 100.4 (2021 16:51)  HR: 102 (2021 11:13) (99 - 133)  BP: 168/95 (2021 11:13) (114/72 - 184/108)  BP(mean): --  RR: 19 (2021 11:13) (16 - 22)  SpO2: 97% (2021 11:13) (97% - 100%)    CONSTITUTIONAL: NAD, on room air, cachectic, elderly woman  HEENT: sclera clear, dry MMM  RESPIRATORY: Normal respiratory effort; lungs are clear to auscultation bilaterally  CARDIOVASCULAR: S1/S2, RRR, no murmurs appreciated; No lower extremity edema  ABDOMEN: soft, Nontender, nondistended, normoactive bowel sounds, no rebound/guarding  PSYCH: intermittent groaning noted  NEUROLOGY: awake, not answering Qs or following commands  SKIN: No rashes; no palpable lesions    LABS:                        8.0    21.20 )-----------( 247      ( 2021 06:23 )             27.7     06-05    160<HH>  |  122<H>  |  140<H>  ----------------------------<  167<H>  3.8   |  21<L>  |  5.17<H>    Ca    7.6<L>      2021 11:05  Phos  5.8     06-05  Mg     3.2     06-05    TPro  8.0  /  Alb  3.0<L>  /  TBili  0.6  /  DBili  x   /  AST  28  /  ALT  22  /  AlkPhos  104  06-04      CARDIAC MARKERS ( 2021 06:29 )  x     / x     / 22 U/L / x     / x          Urinalysis Basic - ( 2021 17:31 )    Color: Light Yellow / Appearance: Turbid / S.015 / pH: x  Gluc: x / Ketone: Negative  / Bili: Negative / Urobili: <2 mg/dL   Blood: x / Protein: 100 mg/dL / Nitrite: Negative   Leuk Esterase: Large / RBC: 5-10 /HPF / WBC MANY /HPF   Sq Epi: x / Non Sq Epi: Few / Bacteria: Moderate          RADIOLOGY & ADDITIONAL TESTS:  Results Reviewed:   Imaging Personally Reviewed:  Electrocardiogram Personally Reviewed:    COORDINATION OF CARE:  Care Discussed with Consultants/Other Providers [Y/N]:  Prior or Outpatient Records Reviewed [Y/N]:

## 2021-06-05 NOTE — H&P ADULT - ATTENDING COMMENTS
Patient seen and examined on 6/5/21, case discussed with Dr. Romaine Fischer. This is a 89F with history of ?Dementia (daughter states patient has baseline memory issues but no official diagnosis of Dementia) and anemia who presents to hospital with worsening encephalopathy. Work up here significant for hypernatremia, ARF, and UTI.   - Will c/w IVF with D5 for her hypernatremia and ARF, monitor urine output  - c/w empiric ceftriaxone for now  - Other management as above

## 2021-06-05 NOTE — PROGRESS NOTE ADULT - PROBLEM SELECTOR PLAN 6
Patient visibly cachectic, decreased PO intake likely in setting of infection and advanced dementia  CTH with advanced microvascular type changes  - nutrition evaluation

## 2021-06-05 NOTE — H&P ADULT - PROBLEM SELECTOR PLAN 3
/5.55; presenting with acute renal failure likely 2/2 extreme dehydration. Has been urinating.   - Received 2L NS; will give maintenance fluid  - trend scr; monitor in am  - avoid nephrotoxins  - no evidence of obstruction or hydro on Ct a/p   - Will check urine lytes  - monitor I/O

## 2021-06-05 NOTE — H&P ADULT - PROBLEM SELECTOR PLAN 9
DVT: HSQ  Diet: NPO until s/s  Dispo: pending clinical improvement  GOC: daughter is primary caretaker however not formal HCP. She would like patient to be dnr/dni but has 6 siblings and she will discuss her mother's code status with them this evening.

## 2021-06-05 NOTE — H&P ADULT - PROBLEM SELECTOR PLAN 7
Seen on Ct  - fu with MRI after improves clinically  - unable to fully assess neuro exam Patient visibly cachectic, decreased PO intake  - nutrition evaluation

## 2021-06-05 NOTE — H&P ADULT - NSHPPHYSICALEXAM_GEN_ALL_CORE
Vital Signs Last 24 Hrs  T(C): 36.4 (04 Jun 2021 19:55), Max: 38 (04 Jun 2021 16:51)  T(F): 97.6 (04 Jun 2021 19:55), Max: 100.4 (04 Jun 2021 16:51)  HR: 99 (04 Jun 2021 23:12) (99 - 133)  BP: 157/74 (04 Jun 2021 23:12) (114/72 - 165/86)  BP(mean): --  RR: 22 (04 Jun 2021 23:12) (16 - 22)  SpO2: 99% (04 Jun 2021 23:12) (97% - 100%)    PHYSICAL EXAM:  GENERAL: NAD, lying in bed comfortably, cachectic, DMM  HEAD:  Atraumatic, Normocephalic  EYES: EOMI, PERRLA, conjunctiva and sclera clear  ENT: Moist mucous membranes  NECK: Supple, No JVD  CHEST/LUNG: Clear to auscultation bilaterally; No rales, rhonchi, wheezing, or rubs. Unlabored respirations  HEART: tachycardic, regular, no mrg   ABDOMEN: Bowel sounds present; Soft, Nontender, Nondistended. No hepatomegally  EXTREMITIES:  2+ Peripheral Pulses, brisk capillary refill. No clubbing, cyanosis, or edema  NERVOUS SYSTEM:  Alert & Oriented X ?  speech clear. No deficits   MSK: FROM all 4 extremities, full and equal strength  SKIN: No rashes or lesions Vital Signs Last 24 Hrs  T(C): 36.4 (04 Jun 2021 19:55), Max: 38 (04 Jun 2021 16:51)  T(F): 97.6 (04 Jun 2021 19:55), Max: 100.4 (04 Jun 2021 16:51)  HR: 99 (04 Jun 2021 23:12) (99 - 133)  BP: 157/74 (04 Jun 2021 23:12) (114/72 - 165/86)  BP(mean): --  RR: 22 (04 Jun 2021 23:12) (16 - 22)  SpO2: 99% (04 Jun 2021 23:12) (97% - 100%)    PHYSICAL EXAM:  GENERAL: NAD, lying in bed comfortably, cachectic, DMM  EYES: EOMI, PERRL, conjunctiva and sclera clear  ENT: Moist mucous membranes  NECK: Supple, No JVD  CHEST/LUNG: Clear to auscultation bilaterally; No rales, rhonchi, wheezing, or rubs. Unlabored respirations  HEART: tachycardic, regular, no mrg   ABDOMEN: Bowel sounds present; Soft, Nontender, Nondistended. No hepatomegally  EXTREMITIES:  2+ Peripheral Pulses, brisk capillary refill. No clubbing, cyanosis, or edema  NERVOUS SYSTEM:  Alert & Oriented X ?  speech clear. No deficits   MSK: FROM all 4 extremities, full and equal strength  SKIN: No rashes or lesions

## 2021-06-05 NOTE — PROGRESS NOTE ADULT - PROBLEM SELECTOR PLAN 3
Dehydration from decreased PO intake and sepsis  - 4L free water deficit  - Increase to 75cc/hr of d5  -Check Na q8hrs

## 2021-06-05 NOTE — GOALS OF CARE CONVERSATION - ADVANCED CARE PLANNING - CONVERSATION DETAILS
Delmy states she would like to see if her mother improves on current management to improve the hypernatremia and TRINH as well as Antibiotics for the UTI. If her mother were to decompensate, she would not want resuscitation, she would want her to be comfortable.

## 2021-06-05 NOTE — H&P ADULT - HISTORY OF PRESENT ILLNESS
89 yo F pmhx anemia, recent UTI presenting from home for weakness and lethargy. History provided by daughter at bedside via Maori interpeter. Daughter is her primary care taker. For the last week or so the patient has been extremely lethargic and tired. She has not been eating or drinking the same amount that she was, associated with some coughing. Daughter even bought a syringe to put water in to try to help her get some water. The patient was not complaining of anything though was noticed to be weak and tired. She typically goes around the house well on her own with okay memory but has not been the case recently. The patient has no acute complaints though does moan when her abdomen gets touched. She wakes up with questions by her daughter but overall refusing to answer any questions or respond to any requests for physical exam maneuvers. per daughter to fevers, chills, chest pain sob at home. Concerned about her progressive weakness as her mother before this has been healthy her entire life. In early May came to the ED with similar complaints, found to have UTI and discharged with antibiotics.     In the ed the patient was febrile, tachycardic withe elvated WBC and +U/A. Also found to have SCr to 5.55 and hypernatremic to 164. She was given 2L NS and rocephin as well as tylenol.  89 yo F pmhx anemia, recent UTI presenting from home for weakness and lethargy. History provided by daughter at bedside via Amharic interpeter. Daughter is her primary care taker. For the last week or so the patient has been extremely lethargic and tired. She has not been eating or drinking the same amount that she was, associated with some coughing. Daughter even bought a syringe to put water in to try to help her get some water. The patient was not complaining of anything though was noticed to be weak and tired. She typically goes around the house well on her own with okay memory but has not been the case recently. The patient has no acute complaints though does moan when her abdomen gets touched. She wakes up with questions by her daughter but overall refusing to answer any questions or respond to any requests for physical exam maneuvers. per daughter no fevers, chills, chest pain sob at home. Concerned about her progressive weakness as her mother before this has been healthy her entire life. In early May came to the ED with similar complaints, found to have UTI and discharged with antibiotics.     In the ed the patient was febrile, tachycardic withe elvated WBC and +U/A. Also found to have SCr to 5.55 and hypernatremic to 164. She was given 2L NS and rocephin as well as tylenol.

## 2021-06-05 NOTE — H&P ADULT - PROBLEM SELECTOR PLAN 6
Patient visibly cachectic, decreased PO intake  - nutrition evaluation TWI in lateral leads; new on EKG  - monitor on tele TWI in lateral leads; new on EKG  - monitor on tele  - will check trop from initial labs; and recheck now for delta TWI in lateral leads; new on EKG, suspect rate related TWI  - monitor on tele  - will check trop from initial labs; and recheck now for delta

## 2021-06-05 NOTE — H&P ADULT - PROBLEM SELECTOR PLAN 1
Patient presenting septic with +U/A. Recent ed visit for UTI. urine culture at that time showing pansensitive e.coli. CT a/p showing cystitis.   - Will continue with IV ceftriaxone.   - follow up urine culture

## 2021-06-05 NOTE — H&P ADULT - ASSESSMENT
89 yo F pmhx anemia, recent UTI presenting from home for weakness and lethargy found to have sepsis 2/2 UTI, Severe dehydration, and Acute renal failure.

## 2021-06-05 NOTE — H&P ADULT - NSHPLABSRESULTS_GEN_ALL_CORE
10.2   20.28 )-----------( 348      ( 2021 19:10 )             35.1       06-04    164<HH>  |  117<H>  |  144<H>  ----------------------------<  157<H>  4.2   |  22  |  5.55<H>    Ca    8.7      2021 19:10  Phos  6.8     06-04  Mg     3.7     06-04    TPro  8.0  /  Alb  3.0<L>  /  TBili  0.6  /  DBili  x   /  AST  28  /  ALT  22  /  AlkPhos  104  06-04          Urinalysis Basic - ( 2021 17:31 )    Color: Light Yellow / Appearance: Turbid / S.015 / pH: x  Gluc: x / Ketone: Negative  / Bili: Negative / Urobili: <2 mg/dL   Blood: x / Protein: 100 mg/dL / Nitrite: Negative   Leuk Esterase: Large / RBC: 5-10 /HPF / WBC MANY /HPF   Sq Epi: x / Non Sq Epi: Few / Bacteria: Moderate        < from: CT Abdomen and Pelvis No Cont (21 @ 21:04) >    FINDINGS:    Solid organ evaluation is suboptimal inherent to noncontrast CT technique.    LOWER CHEST: Patchy right lower lobe linear opacities due to discoid atelectasis and/or scarring. Superimposed infection not excluded.    LIVER: Hepatic cyst and additional subcentimeter hypodensities too small to characterize.  BILE DUCTS: Normal caliber.  GALLBLADDER: Distended, however no cholelithiasis, wall thickening, or pericholecystic fluid.  SPLEEN: Within normal limits.  PANCREAS: Within normal limits.  ADRENALS: Within normal limits.    KIDNEYS/URETERS: Within normal limits.  BLADDER: Wall thickening due tounderdistention and/or cystitis. Layering high density in the bladder may reflect small calculi are contrast material.  REPRODUCTIVE ORGANS: Hysterectomy.    BOWEL: No bowel obstruction. Appendix is not visualized. No evidence of inflammation in the pericecal region. Moderate stool is seen throughout the colon.  PERITONEUM: No free air.  VESSELS: Atherosclerotic changes.  RETROPERITONEUM/LYMPH NODES: No lymphadenopathy.  ABDOMINAL WALL: Within normal limits.  BONES: Diffuse osteopenia and multilevel spondylosis. Age-indeterminate superior endplate compression deformity of L2, L3, and L4. Trace grade 1 L5-S1 and L4-5 anterolisthesis.    IMPRESSION:    Urinary bladder wall thickening due to underdistention or cystitis. Correlate with urinalysis.    Age-indeterminate superior L2, L3, and L4 endplate compression deformities. Correlate with site of pain. Consider MRI.    < end of copied text > LABS and ADDITIONAL STUDIES:                 10.2   20.28 )-----------( 348      ( 2021 19:10 )               35.1   Complete Blood Count + Automated Diff (21 @ 19:10)   Auto Neutrophil #: 16.73 K/uL   Auto Neutrophil %: 82.5%     -    164<HH>  |  117<H>  |  144<H>  ----------------------------<  157<H>  4.2   |  22  |  5.55<H>    Ca    8.7      2021 19:10  Phos  6.8     06-  Mg     3.7     06-04    TPro  8.0  /  Alb  3.0<L>  /  TBili  0.6  /  DBili  x   /  AST  28  /  ALT  22  /  AlkPhos  104  06-04        Urinalysis Basic - ( 2021 17:31 )  Color: Light Yellow / Appearance: Turbid / S.015 / pH: x  Gluc: x / Ketone: Negative  / Bili: Negative / Urobili: <2 mg/dL   Blood: x / Protein: 100 mg/dL / Nitrite: Negative   Leuk Esterase: Large / RBC: 5-10 /HPF / WBC MANY /HPF   Sq Epi: x / Non Sq Epi: Few / Bacteria: Moderate    Blood Gas Venous - Lactate (21 @ 19:10)   Blood Gas Venous - Lactate: 3.5  Blood Gas Venous - Lactate (21 @ 00:32)   Blood Gas Venous - Lactate: 1.0 mmol/L     < from: CT Abdomen and Pelvis No Cont (21 @ 21:04) >    FINDINGS:    Solid organ evaluation is suboptimal inherent to noncontrast CT technique.    LOWER CHEST: Patchy right lower lobe linear opacities due to discoid atelectasis and/or scarring. Superimposed infection not excluded.    LIVER: Hepatic cyst and additional subcentimeter hypodensities too small to characterize.  BILE DUCTS: Normal caliber.  GALLBLADDER: Distended, however no cholelithiasis, wall thickening, or pericholecystic fluid.  SPLEEN: Within normal limits.  PANCREAS: Within normal limits.  ADRENALS: Within normal limits.    KIDNEYS/URETERS: Within normal limits.  BLADDER: Wall thickening due to underdistention and/or cystitis. Layering high density in the bladder may reflect small calculi are contrast material.  REPRODUCTIVE ORGANS: Hysterectomy.    BOWEL: No bowel obstruction. Appendix is not visualized. No evidence of inflammation in the pericecal region. Moderate stool is seen throughout the colon.  PERITONEUM: No free air.  VESSELS: Atherosclerotic changes.  RETROPERITONEUM/LYMPH NODES: No lymphadenopathy.  ABDOMINAL WALL: Within normal limits.  BONES: Diffuse osteopenia and multilevel spondylosis. Age-indeterminate superior endplate compression deformity of L2, L3, and L4. Trace grade 1 L5-S1 and L4-5 anterolisthesis.    IMPRESSION:    Urinary bladder wall thickening due to underdistention or cystitis. Correlate with urinalysis.    Age-indeterminate superior L2, L3, and L4 endplate compression deformities. Correlate with site of pain. Consider MRI.    < end of copied text >

## 2021-06-05 NOTE — H&P ADULT - PROBLEM SELECTOR PLAN 2
Tachycardic, elevated WBC, fevers likely 2/2 UTI . Tachycardic, elevated WBC, fevers likely 2/2 UTI .  - fu cultures as above  - cw ceftriaxone

## 2021-06-05 NOTE — H&P ADULT - PROBLEM SELECTOR PLAN 8
DVT: HSQ  Diet: NPO until s/s  Dispo: pending clinical improvement  GOC: daughter is primary caretaker however not formal HCP. She would like patient to be dnr/dni but has 6 siblings and she will discuss her mother's code status with them this evening. Seen on Ct  - fu with MRI after improves clinically  - unable to fully assess neuro exam Seen on Ct  - can consider MRI after improves clinically  - unable to fully assess neuro exam

## 2021-06-06 NOTE — PROGRESS NOTE ADULT - PROBLEM SELECTOR PLAN 6
Patient visibly cachectic, decreased PO intake likely in setting of infection and advanced dementia  CTH with advanced microvascular type changes  - nutrition evaluation  advance directives discussed

## 2021-06-06 NOTE — PROGRESS NOTE ADULT - SUBJECTIVE AND OBJECTIVE BOX
IN[facility]PLETE [facility] Division of Hospital Medicine  Lyndsey Cunha DO  Pager (TRICEF, 8A-5P): [phone]      Patient is a 89y old  Female who presents with a chief complaint of Dehydration (2021 09:42)      SUBJECTIVE / OVERNIGHT EVENTS: No acute events overnight. ROS limited. Patient reports pain in abdomen. Son at bedside.    MEDICATIONS  (STANDING):  cefTRIAXone   IVPB 1000 milliGRAM(s) IV Intermittent every 24 hours  dextrose 5%. 1000 milliLiter(s) (75 mL/Hr) IV Continuous <Continuous>  heparin   Injectable 5000 Unit(s) SubCutaneous every 12 hours    MEDICATIONS  (PRN):      CAPILLARY BLOOD GLUCOSE        I&O's Summary    2021 07:01  -  2021 07:00  --------------------------------------------------------  IN: 900 mL / OUT: 700 mL / NET: 200 mL        PHYSICAL EXAM:  Vital Signs Last 24 Hrs  T(C): 36.1 (2021 12:03), Max: 36.7 (2021 04:55)  T(F): 97 (2021 12:03), Max: 98.1 (2021 04:55)  HR: 90 (2021 12:03) (84 - 102)  BP: 162/83 (2021 12:03) (156/71 - 176/99)  BP(mean): --  RR: 16 (2021 12:03) (16 - 19)  SpO2: 97% (2021 12:03) (97% - 99%)    CONSTITUTIONAL: NAD, on room air, cachectic, elderly woman  HEENT: sclera clear, dry MMM  RESPIRATORY: Normal respiratory effort; lungs are clear to auscultation bilaterally  CARDIOVASCULAR: S1/S2, RRR, no murmurs appreciated; No lower extremity edema  ABDOMEN: soft, some grimacing to abdominal palpation, nondistended, normoactive bowel sounds, no rebound/guarding  NEUROLOGY: awake, more verbal than yesterday, less lethargic  SKIN: No rashes; no palpable lesions    LABS:                        7.6    17.53 )-----------( 191      ( 2021 07:22 )             26.2     06-06    151<H>  |  117<H>  |  129<H>  ----------------------------<  146<H>  4.2   |  18<L>  |  5.15<H>    Ca    7.5<L>      2021 07:22  Phos  4.5     06-06  Mg     2.8     06-06    TPro  8.0  /  Alb  3.0<L>  /  TBili  0.6  /  DBili  x   /  AST  28  /  ALT  22  /  AlkPhos  104  06-04      CARDIAC MARKERS ( 2021 06:29 )  x     / x     / 22 U/L / x     / x          Urinalysis Basic - ( 2021 17:31 )    Color: Light Yellow / Appearance: Turbid / S.015 / pH: x  Gluc: x / Ketone: Negative  / Bili: Negative / Urobili: <2 mg/dL   Blood: x / Protein: 100 mg/dL / Nitrite: Negative   Leuk Esterase: Large / RBC: 5-10 /HPF / WBC MANY /HPF   Sq Epi: x / Non Sq Epi: Few / Bacteria: Moderate        Culture - Blood (collected 2021 21:11)  Source: .Blood Blood-Venous  Preliminary Report (2021 22:01):    No growth to date.    Culture - Blood (collected 2021 21:11)  Source: .Blood Blood  Preliminary Report (2021 22:01):    No growth to date.    Culture - Urine (collected 2021 19:42)  Source: .Urine Catheterized  Final Report (2021 20:36):    <10,000 CFU/mL Normal Urogenital Francia        RADIOLOGY & ADDITIONAL TESTS:  Results Reviewed:   Imaging Personally Reviewed:  Electrocardiogram Personally Reviewed:    COORDINATION OF CARE:  Care Discussed with Consultants/Other Providers [Y/N]:  Prior or Outpatient Records Reviewed [Y/N]:

## 2021-06-06 NOTE — PROGRESS NOTE ADULT - PROBLEM SELECTOR PLAN 3
Dehydration from decreased PO intake and sepsis  - 4L free water deficit  - c/w 75cc/hr of d5, Na downtrending  Repeat BMP in AM

## 2021-06-07 NOTE — DIETITIAN INITIAL EVALUATION ADULT. - OTHER INFO
Pt 89 yo female with PMHx of anemia, recent UTI presented from home for weakness and lethargy, Pt found to have sepsis 2/2 UTI, Severe dehydration, and Acute renal failure - per chart review.     At time of visit, Pt appears lethargic. RDN called Pt by her name multiple times, but unable to arouse Pt. Pt NPO at present. Of note, Pt failed Swallow Bedside Assessment Adult, SLP rec: Consider short term non oral means of nutrition/hydration/medication at MD's discretion (6/5). Plan for Pt to be NPO until repeat speech & swallow.     Unable to discuss diet or weight history @ present. Of note Pt DNR/DNI. RDN remains available.

## 2021-06-07 NOTE — ADVANCED PRACTICE NURSE CONSULT - ASSESSMENT
A&Ox0, bedbound, incontinent of urine (External incontinence device placed) and stool. Skin warm, dry with increased moisture in intertriginous folds, adequate skin turgor, blanchable erythema on bilateral heels.    Left upper buttock/iliac crest: unstageable pressure injury measures [id number] (unable to determine complete anatomical depth due to necrotic tissue). Borders are irregular. Tissue type is 80% hard, black eschar that is firmly attached to wound base and wound edges and 20% dermis that is located circumferentially around eschar. There is no induration, no increased warmth, no erythema in periwound skin (no signs of soft tissue infection). CT scan of abdomen/pelvis does not show injury or signs of soft tissue infection/OM. Small amount of serous drainage noted on previous dressing, not able to express drainage with palpation in periwound skin. Goal of care: autolytic debridement of necrotic tissue (provide gel dressing to add moisture and soften eschar), monitor for changes in tissue type, protect periwound skin from exudate, manage exudate.    Risk for incontinence dermatitis, skin intact with hyperpigmentation (chronic exposure, no signs of candidiasis) of lower buttock.

## 2021-06-07 NOTE — PROGRESS NOTE ADULT - PROBLEM SELECTOR PLAN 3
Dehydration from decreased PO intake and sepsis  - 4L free water deficit  - s/p 75cc/hr of d5, hold for now d/t overcorrection of Na   - Repeat BMP in PM

## 2021-06-07 NOTE — DIETITIAN INITIAL EVALUATION ADULT. - ADD RECOMMEND
1. Repeat Swallow Bedside Assessment Adult &/or MBS if needed to determine appropriate PO diet & fluid consistency;           2. If PO diet not safe to initiate, suggest initiating alternative means of nutrition support;           3. Reconsult nutrition if Nutrition support as indicated/as per medical condition or plan of care to initiate;        4. Monitor labs, hydration status;

## 2021-06-07 NOTE — ADVANCED PRACTICE NURSE CONSULT - RECOMMEDATIONS
Recommend follow up care at [facility] ([phone], [address] [place] [address]).    Left upper buttock: Cleanse with SAF-clens, rinse with NS, pat dry. Apply Liquid barrier film to periwound skin. Apply Medihoney gel to wound base, cover with foam with border. Change daily.    Continue low air loss bed therapy, continue heel elevation, continue to turn & reposition q2h, continue moisture management with barrier creams & single breathable pad, continue measures to decrease friction/shear/pressure. Continue with nutritional support as per dietary/orders.     Please call wound care service line is further assistance is needed ([phone]).

## 2021-06-07 NOTE — PROGRESS NOTE ADULT - SUBJECTIVE AND OBJECTIVE BOX
PROGRESS NOTE:     Patient is a 89y old  Female who presents with a chief complaint of Dehydration (06 Jun 2021 09:42)      SUBJECTIVE / OVERNIGHT EVENTS: No acute events.     ADDITIONAL REVIEW OF SYSTEMS:    MEDICATIONS  (STANDING):  cefTRIAXone   IVPB 1000 milliGRAM(s) IV Intermittent every 24 hours  heparin   Injectable 5000 Unit(s) SubCutaneous every 12 hours    MEDICATIONS  (PRN):      CAPILLARY BLOOD GLUCOSE        I&O's Summary    06 Jun 2021 07:01  -  07 Jun 2021 07:00  --------------------------------------------------------  IN: 900 mL / OUT: 600 mL / NET: 300 mL    07 Jun 2021 07:01  -  07 Jun 2021 16:18  --------------------------------------------------------  IN: 0 mL / OUT: 0 mL / NET: 0 mL        PHYSICAL EXAM:  Vital Signs Last 24 Hrs  T(C): 36.7 (07 Jun 2021 11:16), Max: 36.7 (07 Jun 2021 11:16)  T(F): 98 (07 Jun 2021 11:16), Max: 98 (07 Jun 2021 11:16)  HR: 93 (07 Jun 2021 11:16) (93 - 95)  BP: 152/82 (07 Jun 2021 11:16) (152/82 - 166/74)  BP(mean): --  RR: 18 (07 Jun 2021 11:16) (17 - 18)  SpO2: 100% (07 Jun 2021 11:16) (99% - 100%)    CONSTITUTIONAL: NAD, on room air, cachectic, elderly woman  HEENT: sclera clear, dry MMM  RESPIRATORY: Normal respiratory effort; lungs are clear to auscultation bilaterally  CARDIOVASCULAR: S1/S2, RRR, no murmurs appreciated; No lower extremity edema  ABDOMEN: soft, some grimacing to abdominal palpation, nondistended, normoactive bowel sounds, no rebound/guarding  NEUROLOGY: awake, more verbal than yesterday, less lethargic  SKIN: No rashes; no palpable lesions    LABS:                        7.6    15.81 )-----------( 163      ( 07 Jun 2021 06:36 )             25.5     06-07    142  |  108<H>  |  124<H>  ----------------------------<  123<H>  3.8   |  17<L>  |  4.98<H>    Ca    7.0<L>      07 Jun 2021 06:36  Phos  4.5     06-06  Mg     2.8     06-06                Culture - Blood (collected 04 Jun 2021 21:11)  Source: .Blood Blood-Venous  Preliminary Report (05 Jun 2021 22:01):    No growth to date.    Culture - Blood (collected 04 Jun 2021 21:11)  Source: .Blood Blood  Preliminary Report (05 Jun 2021 22:01):    No growth to date.    Culture - Urine (collected 04 Jun 2021 19:42)  Source: .Urine Catheterized  Final Report (05 Jun 2021 20:36):    <10,000 CFU/mL Normal Urogenital Francia        RADIOLOGY & ADDITIONAL TESTS:  Results Reviewed:   Imaging Personally Reviewed:  Electrocardiogram Personally Reviewed:    COORDINATION OF CARE:  Care Discussed with Consultants/Other Providers [Y/N]:  Prior or Outpatient Records Reviewed [Y/N]:

## 2021-06-07 NOTE — ADVANCED PRACTICE NURSE CONSULT - REASON FOR CONSULT
Patient seen on skin care rounds after wound care referral received for assessment of skin impairment and recommendations of topical management. Chart reviewed: Serum WBC 15.81, blood culture negative, LACE 7, H/H 7.6/25.5, Evelio 11, weight 40.7kg. Patient H/O anemia, recent UTI presenting from home for weakness and lethargy. As per H&P, history provided by daughter at bedside via [place] interpeter. Daughter is her primary care taker. For the last week or so the patient has been extremely lethargic and tired. She has not been eating or drinking the same amount that she was, associated with some coughing. Daughter even bought a syringe to put water in to try to help her get some water. The patient was not complaining of anything though was noticed to be weak and tired. She typically goes around the house well on her own with okay memory but has not been the case recently. The patient has no acute complaints though does moan when her abdomen gets touched. She wakes up with questions by her daughter but overall refusing to answer any questions or respond to any requests for physical exam maneuvers. per daughter no fevers, chills, chest pain sob at home. Concerned about her progressive weakness as her mother before this has been healthy her entire life. In early May came to the ED with similar complaints, found to have UTI and discharged with antibiotics. Admitted with UTI.

## 2021-06-08 NOTE — SWALLOW BEDSIDE ASSESSMENT ADULT - SWALLOW EVAL: DIAGNOSIS
Patient presented with ice chips to increase sensory awareness and coated honey thick and puree consistency teaspoon to labial surface and demonstrated no attempt at bolus collection/ retrieval. Patient demonstrated refusal behaviors via turning head away and tight labial clenching.
SLP and daughter provided patient with trials of puree, patient presenting with severe oral dysphagia. Oral phase characterized by poor acceptance, absent utensil stripping, daughter placing bolus in oral cavity, reduced anterior bolus containment, patient expectorating all trials.  Daughter at bedside provided education and encouragement with no change in result.

## 2021-06-08 NOTE — SWALLOW BEDSIDE ASSESSMENT ADULT - ADDITIONAL RECOMMENDATIONS
1. Oral care to prevent the colonization of oral cavity bacteria  2. Reconsult this service as indicated

## 2021-06-08 NOTE — PROGRESS NOTE ADULT - PROBLEM SELECTOR PLAN 3
Dehydration from decreased PO intake and sepsis  - 4L free water deficit  - corrected s/p D5W  - Monitor BMP

## 2021-06-08 NOTE — SWALLOW BEDSIDE ASSESSMENT ADULT - COMMENTS
As per Hospitalist Note: 89 yo F pmhx anemia, recent UTI presenting from home for weakness and lethargy found to have sepsis 2/2 UTI, Severe dehydration, and Acute renal failure.    HCT: No acute intracranial bleeding.  Advanced chronic microvascular type changes.    CXR: Slight interstitial prominence indeterminate for mild edema or fibrosis. No focal patchy airspace consolidations pleural effusions or pneumothorax.  Aortic arch calcifications. Otherwise cardiomediastinal silhouettes grossly within normal limits for the projection.  Generalized osteopenia. Right shoulder rotator cuff arthropathy.    Patient seen by this service on 6/5/21 for a clinical swallow evaluation, please refer to report for full details.      SLP received patient in bed, arousable to stimuli but eyes remain closed most of the time.  Patient’s daughter at bedside.  SLP utilized [employer] acct# [id number] for English-E[place]ian translation. SLP spent approximately 30 minutes at bedside, providing education and speaking with daughter.
H&P: 89 yo F pmhx anemia, recent UTI presenting from home for weakness and lethargy found to have sepsis 2/2 UTI, Severe dehydration, and Acute renal failure. She has not been eating or drinking the same amount that she was, associated with some coughing. Daughter even bought a syringe to put water in to try to help her get some water.    CTH 6/4/21: No acute intracranial bleeding. Advanced chronic microvascular type changes.  CXR 6/4/21: Slight interstitial prominence indeterminate for mild edema or fibrosis. No focal patchy airspace consolidations pleural effusions or pneumothorax    Patient was seen upright at bedside with daughter present. [employer] utilized for Niuean Interpretation. Patient was awake with minimal vocalizations/ verbalizations. Patient unable to follow simple directions.

## 2021-06-08 NOTE — PROGRESS NOTE ADULT - SUBJECTIVE AND OBJECTIVE BOX
PROGRESS NOTE:     Patient is a 89y old  Female who presents with a chief complaint of Dehydration (07 Jun 2021 16:18)      SUBJECTIVE / OVERNIGHT EVENTS: No acute events.     ADDITIONAL REVIEW OF SYSTEMS:    MEDICATIONS  (STANDING):  dextrose 5% + sodium chloride 0.9%. 1000 milliLiter(s) (50 mL/Hr) IV Continuous <Continuous>  heparin   Injectable 5000 Unit(s) SubCutaneous every 12 hours    MEDICATIONS  (PRN):      CAPILLARY BLOOD GLUCOSE        I&O's Summary    07 Jun 2021 07:01  -  08 Jun 2021 07:00  --------------------------------------------------------  IN: 0 mL / OUT: 350 mL / NET: -350 mL    08 Jun 2021 07:01  -  08 Jun 2021 13:51  --------------------------------------------------------  IN: 0 mL / OUT: 300 mL / NET: -300 mL        PHYSICAL EXAM:  Vital Signs Last 24 Hrs  T(C): 36.9 (08 Jun 2021 13:29), Max: 36.9 (08 Jun 2021 13:29)  T(F): 98.4 (08 Jun 2021 13:29), Max: 98.4 (08 Jun 2021 13:29)  HR: 93 (08 Jun 2021 13:29) (93 - 97)  BP: 160/84 (08 Jun 2021 13:29) (152/84 - 160/84)  BP(mean): --  RR: 17 (08 Jun 2021 13:29) (17 - 18)  SpO2: 98% (08 Jun 2021 13:29) (98% - 100%)    CONSTITUTIONAL: NAD, on room air, cachectic, elderly woman  HEENT: sclera clear, dry MMM  RESPIRATORY: Normal respiratory effort; lungs are clear to auscultation bilaterally  CARDIOVASCULAR: S1/S2, RRR, no murmurs appreciated; No lower extremity edema  ABDOMEN: soft, some grimacing to abdominal palpation, nondistended, normoactive bowel sounds, no rebound/guarding  NEUROLOGY: awake, more verbal than yesterday, less lethargic  SKIN: No rashes; no palpable lesions      LABS:                        8.0    12.25 )-----------( 179      ( 08 Jun 2021 07:37 )             26.5     06-08    144  |  108<H>  |  130<H>  ----------------------------<  82  4.0   |  17<L>  |  5.27<H>    Ca    7.9<L>      08 Jun 2021 07:07  Phos  5.9     06-08  Mg     2.7     06-08                  RADIOLOGY & ADDITIONAL TESTS:  Results Reviewed:   Imaging Personally Reviewed:  Electrocardiogram Personally Reviewed:    COORDINATION OF CARE:  Care Discussed with Consultants/Other Providers [Y/N]:  Prior or Outpatient Records Reviewed [Y/N]:

## 2021-06-09 NOTE — PROGRESS NOTE ADULT - SUBJECTIVE AND OBJECTIVE BOX
PROGRESS NOTE:     Patient is a 89y old  Female who presents with a chief complaint of Dehydration (08 Jun 2021 13:51)      SUBJECTIVE / OVERNIGHT EVENTS: No acute events.     ADDITIONAL REVIEW OF SYSTEMS:    MEDICATIONS  (STANDING):  dextrose 5% + sodium chloride 0.9%. 1000 milliLiter(s) (80 mL/Hr) IV Continuous <Continuous>  heparin   Injectable 5000 Unit(s) SubCutaneous every 12 hours  metoprolol tartrate Injectable 5 milliGRAM(s) IV Push every 6 hours    MEDICATIONS  (PRN):      CAPILLARY BLOOD GLUCOSE        I&O's Summary    08 Jun 2021 07:01  -  09 Jun 2021 07:00  --------------------------------------------------------  IN: 900 mL / OUT: 1050 mL / NET: -150 mL        PHYSICAL EXAM:  Vital Signs Last 24 Hrs  T(C): 36.4 (09 Jun 2021 11:09), Max: 36.6 (08 Jun 2021 18:40)  T(F): 97.5 (09 Jun 2021 11:09), Max: 97.8 (08 Jun 2021 18:40)  HR: 42 (09 Jun 2021 11:26) (42 - 93)  BP: 176/82 (09 Jun 2021 11:26) (172/68 - 187/86)  BP(mean): --  RR: 17 (09 Jun 2021 11:09) (17 - 18)  SpO2: 100% (09 Jun 2021 11:09) (97% - 100%)    CONSTITUTIONAL: NAD, on room air, cachectic, elderly woman  HEENT: sclera clear, dry MMM  RESPIRATORY: Normal respiratory effort; lungs are clear to auscultation bilaterally  CARDIOVASCULAR: S1/S2, RRR, no murmurs appreciated; No lower extremity edema  ABDOMEN: soft, some grimacing to abdominal palpation, nondistended, normoactive bowel sounds, no rebound/guarding  NEUROLOGY: awake, opens eyes, moves extremities   SKIN: No rashes; no palpable lesions      LABS:                        8.0    12.25 )-----------( 179      ( 08 Jun 2021 07:37 )             26.5     06-08    144  |  108<H>  |  130<H>  ----------------------------<  82  4.0   |  17<L>  |  5.27<H>    Ca    7.9<L>      08 Jun 2021 07:07  Phos  5.9     06-08  Mg     2.7     06-08                  RADIOLOGY & ADDITIONAL TESTS:  Results Reviewed:   Imaging Personally Reviewed:  Electrocardiogram Personally Reviewed:    COORDINATION OF CARE:  Care Discussed with Consultants/Other Providers [Y/N]:  Prior or Outpatient Records Reviewed [Y/N]:

## 2021-06-10 NOTE — PROGRESS NOTE ADULT - SUBJECTIVE AND OBJECTIVE BOX
PROGRESS NOTE:     Patient is a 89y old  Female who presents with a chief complaint of Dehydration (09 Jun 2021 13:28)      SUBJECTIVE / OVERNIGHT EVENTS: No acute events, patient remains confused.     ADDITIONAL REVIEW OF SYSTEMS:    MEDICATIONS  (STANDING):  dextrose 5%. 1000 milliLiter(s) (100 mL/Hr) IV Continuous <Continuous>  heparin   Injectable 5000 Unit(s) SubCutaneous every 12 hours  hydrALAZINE Injectable 5 milliGRAM(s) IV Push every 6 hours    MEDICATIONS  (PRN):      CAPILLARY BLOOD GLUCOSE      POCT Blood Glucose.: 152 mg/dL (09 Jun 2021 21:18)  POCT Blood Glucose.: 161 mg/dL (09 Jun 2021 16:54)    I&O's Summary    09 Jun 2021 07:01  -  10 Jermain 2021 07:00  --------------------------------------------------------  IN: 1800 mL / OUT: 1200 mL / NET: 600 mL        PHYSICAL EXAM:  Vital Signs Last 24 Hrs  T(C): 36.1 (10 Jermain 2021 11:44), Max: 36.5 (09 Jun 2021 22:01)  T(F): 97 (10 Jermain 2021 11:44), Max: 97.7 (09 Jun 2021 22:01)  HR: 91 (10 Jermain 2021 11:44) (83 - 99)  BP: 146/78 (10 Jermain 2021 11:44) (146/78 - 186/78)  BP(mean): --  RR: 18 (10 Jermain 2021 11:44) (18 - 18)  SpO2: 100% (10 Jermain 2021 11:44) (100% - 100%)    CONSTITUTIONAL: NAD, on room air, cachectic, elderly woman  HEENT: sclera clear, dry MMM  RESPIRATORY: Normal respiratory effort; lungs are clear to auscultation bilaterally  CARDIOVASCULAR: S1/S2, RRR, no murmurs appreciated; No lower extremity edema  ABDOMEN: soft, some grimacing to abdominal palpation, nondistended, normoactive bowel sounds, no rebound/guarding  NEUROLOGY: awake, opens eyes, moves extremities   SKIN: No rashes; no palpable lesions      LABS:    06-10    151<H>  |  117<H>  |  118<H>  ----------------------------<  132<H>  3.5   |  16<L>  |  5.61<H>    Ca    8.0<L>      10 Jermain 2021 07:50  Phos  5.4     06-10  Mg     2.5     06-10                  RADIOLOGY & ADDITIONAL TESTS:  Results Reviewed:   Imaging Personally Reviewed:  Electrocardiogram Personally Reviewed:    COORDINATION OF CARE:  Care Discussed with Consultants/Other Providers [Y/N]:  Prior or Outpatient Records Reviewed [Y/N]:

## 2021-06-10 NOTE — CHART NOTE - NSCHARTNOTEFT_GEN_A_CORE
Hospitalist Medicine PA    Called by RN for NG tube insertion. Pt is a 89y old Female admitted for     . NG tube order noted on chart.    T(C): 36.4 (06-10-21 @ 16:44), Max: 36.5 (06-09-21 @ 22:01)  T(F): 97.6 (06-10-21 @ 16:44), Max: 97.7 (06-09-21 @ 22:01)  HR: 85 (06-10-21 @ 16:44) (83 - 99)  BP: 174/86 (06-10-21 @ 17:00) (146/78 - 188/97)  RR: 18 (06-10-21 @ 17:00) (18 - 18)  SpO2: 100% (06-10-21 @ 17:00) (100% - 100%)  Wt(kg): --    NGT inserted into left   nare. Pt tolerated procedure well. Placement verified via auscultation. Kaofeed at 46 cm. KUB ordered to confirm placement.: prelim in stomach d/w Radiologist   d/w medical attending Dr. Cohn .ok to order free water 250ml q 6 hours. Hospitalist Medicine PA    Called by RN for NG tube insertion. Pt is a 89y old Female admitted for     . NG tube order noted on chart.    T(C): 36.4 (06-10-21 @ 16:44), Max: 36.5 (06-09-21 @ 22:01)  T(F): 97.6 (06-10-21 @ 16:44), Max: 97.7 (06-09-21 @ 22:01)  HR: 85 (06-10-21 @ 16:44) (83 - 99)  BP: 174/86 (06-10-21 @ 17:00) (146/78 - 188/97)  RR: 18 (06-10-21 @ 17:00) (18 - 18)  SpO2: 100% (06-10-21 @ 17:00) (100% - 100%)  Wt(kg): --    NGT inserted into left   nare. Pt tolerated procedure well. Placement verified via auscultation. Kaofeed at 46 cm. KUB ordered to confirm placement.: prelim in stomach d/w Radiologist   d/w medical attending Dr. Cohn .ok to order free water 250ml q 6 hours. as per medical attending Dr. Lalit falcon to order soft bilateral wrist restraints Hospitalist Medicine PA    Called by RN for NG tube insertion. Pt is a 89y old Female admitted for     . NG tube order noted on chart.    T(C): 36.4 (06-10-21 @ 16:44), Max: 36.5 (06-09-21 @ 22:01)  T(F): 97.6 (06-10-21 @ 16:44), Max: 97.7 (06-09-21 @ 22:01)  HR: 85 (06-10-21 @ 16:44) (83 - 99)  BP: 174/86 (06-10-21 @ 17:00) (146/78 - 188/97)  RR: 18 (06-10-21 @ 17:00) (18 - 18)  SpO2: 100% (06-10-21 @ 17:00) (100% - 100%)  Wt(kg): --    NGT inserted into left   nare. Pt tolerated procedure well. Placement verified via auscultation. Kaofeed at 46 cm. KUB ordered to confirm placement.: prelim in stomach d/w Radiologist     d/w dtr: Delmy: [phone] in agreement with plan.  Daughter once one attempt if pt removes ngt.  she would not want the ngt reinserted.    d/w medical attending Dr. Cohn .ok to order free water 250ml q 6 hours. as per medical attending Dr. Lalit falcon to order soft bilateral wrist restraints Hospitalist Medicine PA    Called by RN for NG tube insertion. Pt is a 89y old Female admitted for     . NG tube order noted on chart.    T(C): 36.4 (06-10-21 @ 16:44), Max: 36.5 (06-09-21 @ 22:01)  T(F): 97.6 (06-10-21 @ 16:44), Max: 97.7 (06-09-21 @ 22:01)  HR: 85 (06-10-21 @ 16:44) (83 - 99)  BP: 174/86 (06-10-21 @ 17:00) (146/78 - 188/97)  RR: 18 (06-10-21 @ 17:00) (18 - 18)  SpO2: 100% (06-10-21 @ 17:00) (100% - 100%)  Wt(kg): --    NGT inserted into left   nare. Pt tolerated procedure well. Placement verified via auscultation. Kaofeed at 46 cm. KUB ordered to confirm placement.: prelim in stomach d/w Radiologist     d/w dtr: Delmy: [phone] in agreement with plan.  Daughter once one attempt for ngt to be placed if pt removes ngt.  she would not want the ngt reinserted.     .ok to order free water 250ml q 6 hours. ok to order soft bilateral wrist restraints ok to continue ivf 125ml/hr for elevated sodium.  d/w medical attending Dr. Cohn

## 2021-06-11 NOTE — CONSULT NOTE ADULT - PROBLEM SELECTOR RECOMMENDATION 5
.  Patient is DNR/DNI, MOLST in chart  -multiple attempts made to call the patient's daughters at both numbers listed in chart but no answer  -will follow-up for further GOC

## 2021-06-11 NOTE — CONSULT NOTE ADULT - PROBLEM SELECTOR RECOMMENDATION 4
.  FAST 7C+; bedbound, dysphagia, multiple UTIs  -appears to be undiagnosed but patient is progressively declining  -patient meets criteria for home hospice at this point

## 2021-06-11 NOTE — PROGRESS NOTE ADULT - PROBLEM SELECTOR PLAN 6
- chronic anemia  - iron studies reviewed  - family requesting IV iron, start Venofer 100mg daily x 3 days

## 2021-06-11 NOTE — CHART NOTE - NSCHARTNOTEFT_GEN_A_CORE
ACP MEDICINE COVERAGE - Medicine Subsequent Hospital Care Note    CC:  HPI/Subjective:    ROS:  Denies fever, chills, diaphoresis, malaise, night sweats, generalized weakness, headache, changes in vision, dizziness, cough, sputum production, wheezing, hemoptysis, chest pain, palpitations, shortness of breath, dyspnea on exertion, PND, dysphagia, new abdominal pain, nausea, vomiting, diarrhea, constipation, melena, hematochezia, dysuria, increased urinary frequency/urgency, hematuria, nocturia, numbness/weakness/tingling, any other complaints.    [  ] I spoke with the attending, nurse, and family to obtain the history.  [  ] Unable to obtain history due to ___________.    Vital Signs Last 24 Hrs  T(C): 34.7 (21 @ 15:51), Max: 36.3 (06-10-21 @ 21:15)  T(F): 94.5 (21 @ 15:51), Max: 97.4 (06-10-21 @ 21:15)  HR: 95 (21 @ 15:51) (82 - 100)  BP: 159/77 (21 @ 15:51) (147/67 - 183/95)  RR: 18 (21 @ 15:51) (17 - 18)  SpO2: 99% (21 @ 15:51) (98% - 100%) on (O2)    PHYSICAL EXAM:  Constitutional: NAD, well-developed, well-nourished  Ears, nose, Mouth, and Throat: normal external ears and nose, normal hearing, moist oral mucosa  Eyes: normal conjunctiva, EOMI, PEERL  Neck: supple, no JVD  Respiratory: Clear to auscultation bilaterally. No wheezes, rales or rhonchi. Normal respiratory effort  Cardiovascular: regular rate and rhythm, S1 and S2, no murmurs, rubs or gallops, no edema, 2+ peripheral pulses  Gastrointestinal: soft, nontender, nondistended, +bowel sounds, no hernia  Skin: warm, dry, no rash  Neurologic: unable to assess   Musculoskeletal: no clubbing, no cyanosis, no joint swelling  Psychiatric: A&Ox0, appropriate mood, affect    LABS:        138  |  106  |  110<H>  ----------------------------<  111<H>  3.2<L>   |  15<L>  |  5.53<H>    Ca    8.1<L>      2021 06:32  Phos  5.4       Mg     2.3             CARDIAC ENZYMES    Creatine Kinase, Serum: 22 U/L (21 @ 06:29)          Serum Pro-Brain Natriuretic Peptide:   D-Dimer Assay:     Urinanalysis Basic (21 @ 19:13):  Color: Light Yellow / Appearance: Clear / S.014 / pH: --  Gluc: -- / Ketone: Negative / Bili: Negative / Urobili: <2 mg/dL  Blood: -- / Protein: 30 mg/dL / Nitrite: Negative  Leuk Esterase: Negative / RBC: 5-10 / WBC: 4  Sq Epi: -- / Non Sq Epi: -- / Bacteria: Few      Blood Gas Venous (21 @ 19:13):  pH: 7.35 | HCO3: 22 | pCO2: 41 | pO2: 112 | Lactate: 1.0  pH: 7.36 | HCO3: 22 | pCO2: 41 | pO2: 56 | Lactate: 3.5      Blood Gas Arterial (21 @ 19:13):      CAPILLARY BLOOD GLUCOSE:  POCT Blood Glucose: 138 mg/dL (21 @ 17:02)  POCT Blood Glucose: 181 mg/dL (06-10-21 @ 17:15)  POCT Blood Glucose: 152 mg/dL (21 @ 21:18)      COVID PCR:  NotDetec (21 @ 17:08)  NotDetec (21 @ 14:52)      RADIOLOGY:    MEDICATIONS  (STANDING):  dextrose 5%. 1000 milliLiter(s) (150 mL/Hr) IV Continuous <Continuous>  heparin   Injectable 5000 Unit(s) SubCutaneous every 12 hours  hydrALAZINE Injectable 10 milliGRAM(s) IV Push every 6 hours  iron sucrose IVPB 100 milliGRAM(s) IV Intermittent every 24 hours  piperacillin/tazobactam IVPB.. 3.375 Gram(s) IV Intermittent every 12 hours    MEDICATIONS  (PRN):    I&O's Summary    10 Jermain 2021 07: 07:00  --------------------------------------------------------  IN: 2085 mL / OUT: 1420 mL / NET: 665 mL    2021 07:  -  2021 19:13  --------------------------------------------------------  IN: 1350 mL / OUT: 400 mL / NET: 950 mL      I reviewed the above labs, radiology, medications, tests, telemetry, and EKG interpretation.    ASSESSMENT/PLAN:    - Clinical findings, labs, tests, telemetry, and ekg reviewed with attending. Will monitor patient closely.       Low complexity/risk (30min)   temp 94.7: cont hyperthermia blanket: cxr with new infiltrated: zosyn add : ua: neg: f/u blood cx x 2 --------------------------  d/w medical attending Dr. Cohn in agreement with plan [facility] MEDICINE COVERAGE - Medicine Subsequent Hospital Care Note    CC:  HPI/Subjective:    ROS:  Denies fever, chills, diaphoresis, malaise, night sweats, generalized weakness, headache, changes in vision, dizziness, cough, sputum production, wheezing, hemoptysis, chest pain, palpitations, shortness of breath, dyspnea on exertion, PND, dysphagia, new abdominal pain, nausea, vomiting, diarrhea, constipation, melena, hematochezia, dysuria, increased urinary frequency/urgency, hematuria, nocturia, numbness/weakness/tingling, any other complaints.    [  ] I spoke with the attending, nurse, and family to obtain the history.  [  ] Unable to obtain history due to ___________.    Vital Signs Last 24 Hrs  T(C): 34.7 (21 @ 15:51), Max: 36.3 (06-10-21 @ 21:15)  T(F): 94.5 (21 @ 15:51), Max: 97.4 (06-10-21 @ 21:15)  HR: 95 (21 @ 15:51) (82 - 100)  BP: 159/77 (21 @ 15:51) (147/67 - 183/95)  RR: 18 (21 @ 15:51) (17 - 18)  SpO2: 99% (21 @ 15:51) (98% - 100%) on (O2)    PHYSICAL EXAM:  Constitutional: NAD, well-developed, well-nourished  Ears, nose, Mouth, and Throat: normal external ears and nose, normal hearing, moist oral mucosa  Eyes: normal conjunctiva, EOMI, PEERL  Neck: supple, no JVD  Respiratory: Clear to auscultation bilaterally. No wheezes, rales or rhonchi. Normal respiratory effort  Cardiovascular: regular rate and rhythm, S1 and S2, no murmurs, rubs or gallops, no edema, 2+ peripheral pulses  Gastrointestinal: soft, nontender, nondistended, +bowel sounds, no hernia  Skin: warm, dry, no rash  Neurologic: unable to assess   Musculoskeletal: no clubbing, no cyanosis, no joint swelling  Psychiatric: A&Ox0, appropriate mood, affect    LABS:        138  |  106  |  110<H>  ----------------------------<  111<H>  3.2<L>   |  15<L>  |  5.53<H>    Ca    8.1<L>      2021 06:32  Phos  5.4       Mg     2.3             CARDIAC ENZYMES    Creatine Kinase, Serum: 22 U/L (21 @ 06:29)          Serum Pro-Brain Natriuretic Peptide:   D-Dimer Assay:     Urinanalysis Basic (21 @ 19:13):  Color: Light Yellow / Appearance: Clear / S.014 / pH: --  Gluc: -- / Ketone: Negative / Bili: Negative / Urobili: <2 mg/dL  Blood: -- / Protein: 30 mg/dL / Nitrite: Negative  Leuk Esterase: Negative / RBC: 5-10 / WBC: 4  Sq Epi: -- / Non Sq Epi: -- / Bacteria: Few      Blood Gas Venous (21 @ 19:13):  pH: 7.35 | HCO3: 22 | pCO2: 41 | pO2: 112 | Lactate: 1.0  pH: 7.36 | HCO3: 22 | pCO2: 41 | pO2: 56 | Lactate: 3.5      Blood Gas Arterial (21 @ 19:13):      CAPILLARY BLOOD GLUCOSE:  POCT Blood Glucose: 138 mg/dL (21 @ 17:02)  POCT Blood Glucose: 181 mg/dL (06-10-21 @ 17:15)  POCT Blood Glucose: 152 mg/dL (21 @ 21:18)      COVID PCR:  NotDetec (21 @ 17:08)  NotDetec (21 @ 14:52)      RADIOLOGY:    MEDICATIONS  (STANDING):  dextrose 5%. 1000 milliLiter(s) (150 mL/Hr) IV Continuous <Continuous>  heparin   Injectable 5000 Unit(s) SubCutaneous every 12 hours  hydrALAZINE Injectable 10 milliGRAM(s) IV Push every 6 hours  iron sucrose IVPB 100 milliGRAM(s) IV Intermittent every 24 hours  piperacillin/tazobactam IVPB.. 3.375 Gram(s) IV Intermittent every 12 hours    MEDICATIONS  (PRN):    I&O's Summary    10 Jermain 2021 07: 07:00  --------------------------------------------------------  IN: 2085 mL / OUT: 1420 mL / NET: 665 mL    2021 07:  -  2021 19:13  --------------------------------------------------------  IN: 1350 mL / OUT: 400 mL / NET: 950 mL      I reviewed the above labs, radiology, medications, tests, telemetry, and EKG interpretation.    ASSESSMENT/PLAN:    - Clinical findings, labs, tests, telemetry, and ekg reviewed with attending. Will monitor patient closely.       Low complexity/risk (30min)   temp 94.7: cont hyperthermia blanket: cxr with new infiltrated: zosyn add : ua: neg: f/u blood cx x 2 --------------------------  -pt contineu to be hypothermic continue warming blanket f/u blood cx   d/w medical attending Dr. Cohn in agreement with plan

## 2021-06-11 NOTE — CONSULT NOTE ADULT - PROBLEM SELECTOR RECOMMENDATION 6
.  Complex medical decision making / symptom management in the setting of advanced illness.    Will continue to follow for ongoing GOC discussion.   Emotional support provided, questions answered.  Active Psychosocial Referrals: [facility]    For new or uncontrolled symptoms, please page the Palliative Medicine team ([facility] #[id number]).   The service is available 24/7 (including nights & weekends) to provide symptom management recommendations over the phone as appropriate

## 2021-06-11 NOTE — CONSULT NOTE ADULT - SUBJECTIVE AND OBJECTIVE BOX
[facility] and Palliative [facility]  Puneet Sandoval, Palliative Care Attending  Contact Info: Page [phone] (including Nights/Weekend), message on Microsoft Teams (Puneet Sandoval), or leave  at Palliative Office [phone] (Non-Urgent)    HPI:  89 yo F pmhx anemia, recent UTI presenting from home for weakness and lethargy. History provided by daughter at bedside via [place] interpeter. Daughter is her primary care taker. For the last week or so the patient has been extremely lethargic and tired. She has not been eating or drinking the same amount that she was, associated with some coughing. Daughter even bought a syringe to put water in to try to help her get some water. The patient was not complaining of anything though was noticed to be weak and tired. She typically goes around the house well on her own with okay memory but has not been the case recently. The patient has no acute complaints though does moan when her abdomen gets touched. She wakes up with questions by her daughter but overall refusing to answer any questions or respond to any requests for physical exam maneuvers. per daughter no fevers, chills, chest pain sob at home. Concerned about her progressive weakness as her mother before this has been healthy her entire life. In early May came to the ED with similar complaints, found to have UTI and discharged with antibiotics.     In the ed the patient was febrile, tachycardic withe elvated WBC and +U/A. Also found to have SCr to 5.55 and hypernatremic to 164. She was given 2L NS and rocephin as well as tylenol.  (2021 01:06)    PERTINENT PM/SXH:   No pertinent past medical history    Osteoporosis    Anemia      No significant past surgical history      FAMILY HISTORY:  No pertinent family history in first degree relatives      ITEMS NOT CHECKED ARE NOT PRESENT    SOCIAL HISTORY:   Significant other/partner:  []  Children:  []  Buddhist/Spirituality:  Substance hx:  []   Tobacco hx:  []   Alcohol hx: []   Home Opioid hx:  [] I-Stop Reference No:  Living Situation: []Home  []Long term care  []Rehab []Other    ADVANCE DIRECTIVES:    DNR Yes    []MOLST  []Living Will  DECISION MAKER(s):  [] Health Care Proxy(s)  [] Surrogate(s)  [] Guardian           Name(s):              Phone Number(s):    BASELINE (I)ADL(s) (prior to admission):  [place]: []Total  [] Moderate []Dependent    ALLERGIES:  Allergies    No Known Allergies    Intolerances    MEDICATIONS  (STANDING):  dextrose 5%. 1000 milliLiter(s) (150 mL/Hr) IV Continuous <Continuous>  heparin   Injectable 5000 Unit(s) SubCutaneous every 12 hours  hydrALAZINE Injectable 10 milliGRAM(s) IV Push every 6 hours  iron sucrose IVPB 100 milliGRAM(s) IV Intermittent every 24 hours  piperacillin/tazobactam IVPB. 3.375 Gram(s) IV Intermittent once  piperacillin/tazobactam IVPB.. 3.375 Gram(s) IV Intermittent every 12 hours  potassium chloride  10 mEq/100 mL IVPB 10 milliEquivalent(s) IV Intermittent every 1 hour    MEDICATIONS  (PRN):    PRESENT SYMPTOMS: []Unable to obtain due to poor mentation/encephalopathy  Source if other than patient:  []Family   []Team     Pain: [ ] yes [ ] no  QOL impact -   Location -                    Aggravating Factors -  Quality -  Radiation -  Timing -  Severity (0-10 scale) -   Minimal Acceptable Level (0-10 scale) -    PAIN AD Score:  http://[email or web address].pdf (press ctrl +  left click to view)    Dyspnea:                           []Mild  []Moderate []Severe  Anxiety:                             []Mild []Moderate []Severe  Fatigue:                             []Mild []Moderate []Severe  Nausea:                             []Mild []Moderate []Severe  Loss of Appetite:              []Mild []Moderate []Severe  Constipation:                    []Mild []Moderate []Severe    Other Symptoms:  []All other review of systems negative     Palliative Performance Status Version 2:  %    http://npcrc.org/files/news/palliative_performance_scale_ppsv2.pdf    PHYSICAL EXAM:  GENERAL:  []Alert  []Oriented x   []Lethargic  []Cachexia  []Unarousable  []Verbal  []Non-Verbal  Behavioral:   [] Anxiety  [] Delirium [] Agitation [] Other  HEENT:  []Normal   []Dry mouth   []ET Tube/Trach  []Oral lesions  PULMONARY:   []Clear []Tachypnea  []Audible excessive secretions   []Rhonchi        []Right []Left []Bilateral  []Crackles        []Right []Left []Bilateral  []Wheezing     []Right []Left []Bilateral  CARDIOVASCULAR:    []Regular []Irregular []Tachy  []Titus []Murmur []Other  GASTROINTESTINAL:  []Soft  []Distended   []+BS  []Non tender []Tender  []PEG []OGT/ NGT  Last BM:     GENITOURINARY:  []Normal [] Incontinent   []Oliguria/Anuria   []Purcell  MUSCULOSKELETAL:   []Normal   []Weakness  []Bed/Wheelchair bound []Edema  NEUROLOGIC:   []No focal deficits  [] Cognitive impairment  [] Dysphagia []Dysarthria [] Paresis []Other   SKIN:   []Normal   []Pressure ulcer(s)  []Rash    CRITICAL CARE:  [ ] Shock Present  [ ]Septic [ ]Cardiogenic [ ]Neurologic [ ]Hypovolemic  [ ]  Vasopressors [ ]  Inotropes   [ ] Respiratory failure present [ ] Mechanical Ventilation [ ] Non-invasive ventilatory support [ ] High-Flow  [ ] Acute  [ ] Chronic [ ] Hypoxic  [ ] Hypercarbic [ ] Other  [ ] Other organ failure    Vital Signs Last 24 Hrs  T(C): 34.7 (2021 14:37), Max: 36.3 (10 Jermain 2021 21:15)  T(F): 94.4 (2021 14:37), Max: 97.4 (10 Jermain 2021 21:15)  HR: 95 (2021 15:51) (82 - 100)  BP: 159/77 (2021 15:51) (147/67 - 183/95)  BP(mean): --  RR: 18 (2021 15:51) (17 - 18)  SpO2: 99% (2021 15:51) (98% - 100%) I&O's Summary    10 Jermain 2021 07:01  -  2021 07:00  --------------------------------------------------------  IN: 2085 mL / OUT: 1420 mL / NET: 665 mL        LABS:      138  |  106  |  110<H>  ----------------------------<  111<H>  3.2<L>   |  15<L>  |  5.53<H>    Ca    8.1<L>      2021 06:32  Phos  5.4       Mg     2.3           Urinalysis Basic - ( 2021 16:22 )    Color: Light Yellow / Appearance: Clear / S.014 / pH: x  Gluc: x / Ketone: Negative  / Bili: Negative / Urobili: <2 mg/dL   Blood: x / Protein: 30 mg/dL / Nitrite: Negative   Leuk Esterase: Negative / RBC: 5-10 /HPF / WBC 4 /HPF   Sq Epi: x / Non Sq Epi: 2 /HPF / Bacteria: Few        RADIOLOGY & ADDITIONAL STUDIES:      PROTEIN CALORIE MALNUTRITION PRESENT: [ ]mild [ ]moderate [ ]severe [ ]underweight [ ]morbid obesity  []PPSV2 < or = to 30% []significant weight loss  []poor nutritional intake []catabolic state []anasarca     Albumin, Serum: 3.0 g/dL (21 @ 19:10)  Artificial Nutrition []     REFERRALS:   []Chaplaincy  []Hospice  []Child Life  [x]Social Work  []Case management []Holistic Therapy     Goals of Care Document: HAIM Cunha (21 @ 13:17)  Goals of Care Conversation:   Participants:  · Participants  Family  · Child(lona)  Delmy Cox Fatiqs-[phone]    Advance Directives:  · Caregiver:  yes  · Name  Delmy Cox Wsviqw-[phone]    Conversation Discussion:  · Conversation  Diagnosis; MOLST Discussed; Treatment Options  · Conversation Details  Delmy states she would like to see if her mother improves on current management to improve the hypernatremia and TRINH as well as Antibiotics for the UTI. If her mother were to decompensate, she would not want resuscitation, she would want her to be comfortable.    What Matters Most To Patient and Family:  · What matters most to patient and family  comfort, quality of life    Personal Advance Directives Treatment Guidelines:   Treatment Guidelines:  · Decision Maker  Surrogate  · Treatment Guidelines  DNR Order    MOLST:  · Completed  2021    Location of Discussion:   Duration of Advanced Care Planning Meeting:  · Time spent (in minutes)  17    Location of Discussion:  · Location of discussion  Face to face      Electronic Signatures:  Lyndsey Cunha)  (Signed 2021 13:20)  	Authored: Goals of Care Conversation, Personal Advance Directives Treatment Guidelines, Location of Discussion      Last Updated: 2021 13:20 by Lyndsey Cunha)      Care Coordination Assessment 201 [C. Provider] (21 @ 14:36)    [facility] and Palliative Care  Puneet Sandoval, Palliative Care Attending  Contact Info: Page [phone] (including Nights/Weekend), message on Microsoft Teams (Puneet Sandoval), or leave  at Palliative Office [phone] (Non-Urgent)    HPI:  89 yo F pmhx anemia, recent UTI presenting from home for weakness and lethargy. History provided by daughter at bedside via [place]ese interpeter. Daughter is her primary care taker. For the last week or so the patient has been extremely lethargic and tired. She has not been eating or drinking the same amount that she was, associated with some coughing. Daughter even bought a syringe to put water in to try to help her get some water. The patient was not complaining of anything though was noticed to be weak and tired. She typically goes around the house well on her own with okay memory but has not been the case recently. The patient has no acute complaints though does moan when her abdomen gets touched. She wakes up with questions by her daughter but overall refusing to answer any questions or respond to any requests for physical exam maneuvers. per daughter no fevers, chills, chest pain sob at home. Concerned about her progressive weakness as her mother before this has been healthy her entire life. In early May came to the ED with similar complaints, found to have UTI and discharged with antibiotics.     In the ed the patient was febrile, tachycardic withe elvated WBC and +U/A. Also found to have SCr to 5.55 and hypernatremic to 164. She was given 2L NS and rocephin as well as tylenol.  (2021 01:06)    PERTINENT PM/SXH:   Osteoporosis  Anemia  No significant past surgical history    FAMILY HISTORY:  No pertinent family history in first degree relatives    ITEMS NOT CHECKED ARE NOT PRESENT    SOCIAL HISTORY:   Significant other/partner:  []  Children:  [x]  Confucianist/Spirituality:  Substance hx:  []   Tobacco hx:  []   Alcohol hx: []   Home Opioid hx:  [] I-Stop Reference No:  Living Situation: [x]Home  []Long term care  []Rehab []Other    ADVANCE DIRECTIVES:    DNR Yes  [x]MOLST  []Living Will  DECISION MAKER(s):  [] Health Care Proxy(s)  [x] Surrogate(s)  [] Guardian           Name(s): Delmy Cox             Phone Number(s): [phone]    BASELINE (I)ADL(s) (prior to admission):  [place]: []Total  [] Moderate [x]Dependent    ALLERGIES:  No Known Allergies    MEDICATIONS  (STANDING):  dextrose 5%. 1000 milliLiter(s) (150 mL/Hr) IV Continuous <Continuous>  heparin   Injectable 5000 Unit(s) SubCutaneous every 12 hours  hydrALAZINE Injectable 10 milliGRAM(s) IV Push every 6 hours  iron sucrose IVPB 100 milliGRAM(s) IV Intermittent every 24 hours  piperacillin/tazobactam IVPB. 3.375 Gram(s) IV Intermittent once  piperacillin/tazobactam IVPB.. 3.375 Gram(s) IV Intermittent every 12 hours  potassium chloride  10 mEq/100 mL IVPB 10 milliEquivalent(s) IV Intermittent every 1 hour    MEDICATIONS  (PRN):    PRESENT SYMPTOMS: [x]Unable to obtain due to poor mentation/encephalopathy  Source if other than patient:  []Family   []Team     Pain: [ ] yes [ ] no  QOL impact -   Location -                    Aggravating Factors -  Quality -  Radiation -  Timing -  Severity (0-10 scale) -   Minimal Acceptable Level (0-10 scale) -    PAIN AD Score: 2  http://geriatrictoolkit.[place].[email or web address]/cog/painad.pdf (press ctrl +  left click to view)    Dyspnea:                           []Mild  []Moderate []Severe  Anxiety:                             []Mild []Moderate []Severe  Fatigue:                             []Mild []Moderate []Severe  Nausea:                             []Mild []Moderate []Severe  Loss of Appetite:              []Mild []Moderate []Severe  Constipation:                    []Mild []Moderate []Severe    Other Symptoms:  []All other review of systems negative     Palliative Performance Status Version 2:  20%    http://npcrc.org/files/news/palliative_performance_scale_ppsv2.pdf    PHYSICAL EXAM:  GENERAL:  []Alert  []Oriented x   [x]Lethargic  []Cachexia  []Unarousable  []Verbal  [x]Non-Verbal  Behavioral:   [] Anxiety  [x] Delirium [] Agitation [] Other  HEENT:  []Normal   [x]Dry mouth   []ET Tube/Trach  []Oral lesions  PULMONARY:   [x]Clear []Tachypnea  []Audible excessive secretions   []Rhonchi        []Right []Left []Bilateral  []Crackles        []Right []Left []Bilateral  []Wheezing     []Right []Left []Bilateral  CARDIOVASCULAR:    [x]Regular []Irregular []Tachy  []Titus []Murmur []Other  GASTROINTESTINAL:  [x]Soft  []Distended   [x]+BS  [x]Non tender []Tender  []PEG []OGT/ NGT  Last BM:     GENITOURINARY:  []Normal [x] Incontinent   []Oliguria/Anuria   []Purcell  MUSCULOSKELETAL:   []Normal   []Weakness  [x]Bed/Wheelchair bound []Edema  NEUROLOGIC:   []No focal deficits  [x] Cognitive impairment  [x] Dysphagia []Dysarthria [] Paresis []Other   SKIN:   [x]Normal   []Pressure ulcer(s)  []Rash    CRITICAL CARE:  [ ] Shock Present  [ ]Septic [ ]Cardiogenic [ ]Neurologic [ ]Hypovolemic  [ ]  Vasopressors [ ]  Inotropes   [ ] Respiratory failure present [ ] Mechanical Ventilation [ ] Non-invasive ventilatory support [ ] High-Flow  [ ] Acute  [ ] Chronic [ ] Hypoxic  [ ] Hypercarbic [ ] Other  [ ] Other organ failure    Vital Signs Last 24 Hrs  T(C): 34.7 (2021 14:37), Max: 36.3 (10 Jermain 2021 21:15)  T(F): 94.4 (2021 14:37), Max: 97.4 (10 Jermain 2021 21:15)  HR: 95 (2021 15:51) (82 - 100)  BP: 159/77 (2021 15:51) (147/67 - 183/95)  BP(mean): --  RR: 18 (2021 15:51) (17 - 18)  SpO2: 99% (2021 15:51) (98% - 100%) I&O's Summary    10 Jermain 2021 07:01  -  2021 07:00  --------------------------------------------------------  IN: 2085 mL / OUT: 1420 mL / NET: 665 mL        LABS:      138  |  106  |  110<H>  ----------------------------<  111<H>  3.2<L>   |  15<L>  |  5.53<H>    Ca    8.1<L>      2021 06:32  Phos  5.4       Mg     2.3           Urinalysis Basic - ( 2021 16:22 )    Color: Light Yellow / Appearance: Clear / S.014 / pH: x  Gluc: x / Ketone: Negative  / Bili: Negative / Urobili: <2 mg/dL   Blood: x / Protein: 30 mg/dL / Nitrite: Negative   Leuk Esterase: Negative / RBC: 5-10 /HPF / WBC 4 /HPF   Sq Epi: x / Non Sq Epi: 2 /HPF / Bacteria: Few    RADIOLOGY & ADDITIONAL STUDIES:  < from: CT Abdomen and Pelvis No Cont (21 @ 21:04) >  LOWER CHEST: Patchy right lower lobe linear opacities due to discoid atelectasis and/or scarring. Superimposed infection not excluded.    LIVER: Hepatic cyst and additional subcentimeter hypodensities too small to characterize.  BILE DUCTS: Normal caliber.  GALLBLADDER: Distended, however no cholelithiasis, wall thickening, or pericholecystic fluid.  SPLEEN: Within normal limits.  PANCREAS: Within normal limits.  ADRENALS: Within normal limits.    KIDNEYS/URETERS: Within normal limits.  BLADDER: Wall thickening due tounderdistention and/or cystitis. Layering high density in the bladder may reflect small calculi are contrast material.  REPRODUCTIVE ORGANS: Hysterectomy.    BOWEL: No bowel obstruction. Appendix is not visualized. No evidence of inflammation in the pericecal region. Moderate stool is seen throughout the colon.  PERITONEUM: No free air.  VESSELS: Atherosclerotic changes.  RETROPERITONEUM/LYMPH NODES: No lymphadenopathy.  ABDOMINAL WALL: Within normal limits.  BONES: Diffuse osteopenia and multilevel spondylosis. Age-indeterminate superior endplate compression deformity of L2, L3, and L4. Trace grade 1 L5-S1 and L4-5 anterolisthesis.    IMPRESSION:    Urinary bladder wall thickening due to underdistention or cystitis. Correlate with urinalysis.    Age-indeterminate superior L2, L3, and L4 endplate compression deformities. Correlate with site of pain. Consider MRI.    PROTEIN CALORIE MALNUTRITION PRESENT: [ ]mild [ ]moderate [ ]severe [ ]underweight [ ]morbid obesity  []PPSV2 < or = to 30% []significant weight loss  []poor nutritional intake []catabolic state []anasarca     Albumin, Serum: 3.0 g/dL (21 @ 19:10)  Artificial Nutrition []     REFERRALS:   []Chaplaincy  []Hospice  []Child Life  [x]Social Work  []Case management []Holistic Therapy     Goals of Care Document: HAIM Cunha (21 @ 13:17)  Goals of Care Conversation:   Participants:  · Participants  Family  · Child(lona)  Delmy Cox [phone]    Advance Directives:  · Caregiver:  yes  · Name  Delmy Cox Elason-[phone]    Conversation Discussion:  · Conversation  Diagnosis; MOLST Discussed; Treatment Options  · Conversation Details  Delmy states she would like to see if her mother improves on current management to improve the hypernatremia and TRINH as well as Antibiotics for the UTI. If her mother were to decompensate, she would not want resuscitation, she would want her to be comfortable.    Electronic Signatures:  Lyndsey Cunha ()  (Signed 2021 13:20)  	Authored: Goals of Care Conversation, Personal Advance Directives Treatment Guidelines, Location of Discussion

## 2021-06-11 NOTE — PROGRESS NOTE ADULT - SUBJECTIVE AND OBJECTIVE BOX
PROGRESS NOTE:     Patient is a 89y old  Female who presents with a chief complaint of Dehydration (10 Jermain 2021 13:59)      SUBJECTIVE / OVERNIGHT EVENTS: Patient removed NGT overnight.     ADDITIONAL REVIEW OF SYSTEMS:    MEDICATIONS  (STANDING):  dextrose 5%. 1000 milliLiter(s) (150 mL/Hr) IV Continuous <Continuous>  heparin   Injectable 5000 Unit(s) SubCutaneous every 12 hours  hydrALAZINE Injectable 10 milliGRAM(s) IV Push every 6 hours  iron sucrose IVPB 100 milliGRAM(s) IV Intermittent every 24 hours    MEDICATIONS  (PRN):      CAPILLARY BLOOD GLUCOSE      POCT Blood Glucose.: 181 mg/dL (10 Jermain 2021 17:15)    I&O's Summary    10 Jermain 2021 07:01  -  11 Jun 2021 07:00  --------------------------------------------------------  IN: 2085 mL / OUT: 1420 mL / NET: 665 mL        PHYSICAL EXAM:  Vital Signs Last 24 Hrs  T(C): 34.7 (11 Jun 2021 14:37), Max: 36.4 (10 Jermain 2021 16:44)  T(F): 94.4 (11 Jun 2021 14:37), Max: 97.6 (10 Jermain 2021 16:44)  HR: 97 (11 Jun 2021 14:37) (82 - 100)  BP: 147/67 (11 Jun 2021 14:37) (147/67 - 188/97)  BP(mean): --  RR: 18 (11 Jun 2021 14:37) (17 - 18)  SpO2: 100% (11 Jun 2021 14:37) (98% - 100%)    CONSTITUTIONAL: NAD, on room air, cachectic, elderly woman  HEENT: sclera clear, dry MMM  RESPIRATORY: Normal respiratory effort; lungs are clear to auscultation bilaterally  CARDIOVASCULAR: S1/S2, RRR, no murmurs appreciated; No lower extremity edema  ABDOMEN: soft, nontender, nondistended, normoactive bowel sounds, no rebound/guarding  NEUROLOGY: awake, opens eyes, moves extremities   SKIN: No rashes; no palpable lesions    LABS:    06-11    138  |  106  |  110<H>  ----------------------------<  111<H>  3.2<L>   |  15<L>  |  5.53<H>    Ca    8.1<L>      11 Jun 2021 06:32  Phos  5.4     06-11  Mg     2.3     06-11                  RADIOLOGY & ADDITIONAL TESTS:  Results Reviewed:   Imaging Personally Reviewed:  Electrocardiogram Personally Reviewed:    COORDINATION OF CARE:  Care Discussed with Consultants/Other Providers [Y/N]:  Prior or Outpatient Records Reviewed [Y/N]:

## 2021-06-11 NOTE — CONSULT NOTE ADULT - ASSESSMENT
Full Note to Follow.    ·	no answer from any of the numbers listed  ·	goals established so far: DNR/DNI, no re-insertion of NGT if pulled out and likely accepting of pleasure feeds in the long run  ·	patient meets criteria for hospice 89yo F with PMH of ?Dementia, Anemia, and Osteoporosis p/w weakness in the setting of TRINH/UTI. Palliative consulted for complex medical decision making in the setting of advanced illness.    ·	no answer from any of the numbers listed  ·	goals established so far: DNR/DNI, trial of NGT and likely accepting of pleasure feeds in the long run  ·	patient would be eligible for home hospice

## 2021-06-11 NOTE — CONSULT NOTE ADULT - PROBLEM SELECTOR RECOMMENDATION 2
.  Significant Renal Failure with no improvement over the course of the hospitalization  -would not be a candidate for RRT  -if there is no improvement, this would also be an applicable hospice diagnosis

## 2021-06-11 NOTE — CHART NOTE - NSCHARTNOTEFT_GEN_A_CORE
Hospitalist Medicine     Called by RN for NG tube insertion. Pt is a 89y old Female admitted for  FTT  . NG tube order noted on chart.    T(C): 34.7 (06-11-21 @ 15:51), Max: 36.3 (06-10-21 @ 21:15)  T(F): 94.5 (06-11-21 @ 15:51), Max: 97.4 (06-10-21 @ 21:15)  HR: 95 (06-11-21 @ 15:51) (82 - 100)  BP: 159/77 (06-11-21 @ 15:51) (147/67 - 183/95)  RR: 18 (06-11-21 @ 15:51) (17 - 18)  SpO2: 99% (06-11-21 @ 15:51) (98% - 100%)  Wt(kg): --    NGT inserted into  left    nare at 48 cm . Pt tolerated procedure well. Placement verified via auscultation. KUB ordered to confirm placement.   as per Delmy daughter wants ngt reinserted with wrist restraints d/w Dr. Cohn in agreement with plan

## 2021-06-11 NOTE — CHART NOTE - NSCHARTNOTEFT_GEN_A_CORE
Nutrition Consult X Tube Feed recommendations  Source:   other [X] Medical Chart, ACP   Diet rx: Pt NPO @ present     Pt 89 yo female with PMHx of Anemia, recent UTI presented from home for weakness and lethargy; Pt found to have sepsis 2/2 UTI, Severe dehydration, and Acute renal failure - per chart review.     At time of visit, Pt appears lethargic. Of note Pt NPO with 2 failed speech and swallow evaluations; NGT was placed on 6/10 for tube feedings and free water but Pt pulled out NGT.   Of note Pt's daughter no in agreement NGT reinsertion. Pt DNR/DNI. Defer Nutrition Plan of Care to MD based on GOC discussion and decisions of Pt's family.   RDN remains available, reconsult nutrition if warranted.       Pt's weight: 40.7 Kg (6/5)   Pertinent Medications: Heparin,   Pertinent Labs:  06-11 Na138 mmol/L Glu 111 mg/dL<H> K+ 3.2 mmol/L<L> Cr  5.53 mg/dL<H>  mg/dL<H> 06-11 Phos 5.4 mg/dL<H> 06-04 Alb 3.0 g/dL<L>  Skin: per flow sheet Pt with pressure injuries: l upper buttock - unstageable    Estimated Needs: [X] no change since previous assessment  Previous Nutrition Diagnosis: [X] Malnutrition   Nutrition Diagnosis is [X] ongoing     Nutrition Interventions/Recommendations:  1. Defer Nutrition Plan of Care to MD based on GOC discussion and decisions of Pt's family;   2. Monitor labs, weekly weights, hydration status;   RDN remains available, reconsult nutrition if warranted

## 2021-06-11 NOTE — CONSULT NOTE ADULT - PROBLEM SELECTOR RECOMMENDATION 9
.  Appears uncomfortable with the interventions being performed  -consider a trial of standing Tylenol Suspension 650mg via NGT q6h x1-2 days

## 2021-06-12 NOTE — PROGRESS NOTE ADULT - PROBLEM SELECTOR PLAN 3
Dehydration from decreased PO intake and sepsis  - resolved s/p D5W, hold IVF at this time d/t low Na   - Monitor BMP Dehydration from decreased PO intake and sepsis  - resolved s/p D5W, hold IVF at this time d/t low Na   - c/w free water boluses via NGT for now   - Monitor BMP

## 2021-06-12 NOTE — PROGRESS NOTE ADULT - SUBJECTIVE AND OBJECTIVE BOX
PROGRESS NOTE:     Patient is a 89y old  Female who presents with a chief complaint of Dehydration (2021 15:51)      SUBJECTIVE / OVERNIGHT EVENTS: No acute events.     ADDITIONAL REVIEW OF SYSTEMS:    MEDICATIONS  (STANDING):  heparin   Injectable 5000 Unit(s) SubCutaneous every 12 hours  hydrALAZINE Injectable 10 milliGRAM(s) IV Push every 6 hours  piperacillin/tazobactam IVPB.. 3.375 Gram(s) IV Intermittent every 12 hours    MEDICATIONS  (PRN):      CAPILLARY BLOOD GLUCOSE      POCT Blood Glucose.: 142 mg/dL (2021 00:47)  POCT Blood Glucose.: 138 mg/dL (2021 17:02)    I&O's Summary    2021 07:01  -  2021 07:00  --------------------------------------------------------  IN: 3260 mL / OUT: 400 mL / NET: 2860 mL    2021 07:01  -  2021 16:09  --------------------------------------------------------  IN: 550 mL / OUT: 150 mL / NET: 400 mL        PHYSICAL EXAM:  Vital Signs Last 24 Hrs  T(C): 36.3 (2021 11:43), Max: 36.3 (2021 22:00)  T(F): 97.4 (2021 11:43), Max: 97.4 (2021 22:00)  HR: 97 (2021 11:50) (97 - 108)  BP: 147/55 (2021 11:43) (142/75 - 151/62)  BP(mean): --  RR: 18 (2021 11:43) (18 - 19)  SpO2: 100% (2021 11:43) (94% - 100%)    CONSTITUTIONAL: cachectic, elderly woman, +NGT   HEENT: sclera clear, dry MMM  RESPIRATORY: Normal respiratory effort; lungs are clear to auscultation bilaterally  CARDIOVASCULAR: S1/S2, RRR, no murmurs appreciated; No lower extremity edema  ABDOMEN: soft, nontender, nondistended, normoactive bowel sounds, no rebound/guarding  NEUROLOGY: awake, not answering questions, opens eyes, moves extremities   SKIN: No rashes; no palpable lesions    LABS:        127<L>  |  96<L>  |  105<H>  ----------------------------<  126<H>  4.0   |  12<L>  |  5.22<H>    Ca    7.8<L>      2021 07:40  Phos  5.2       Mg     2.0                 Urinalysis Basic - ( 2021 16:22 )    Color: Light Yellow / Appearance: Clear / S.014 / pH: x  Gluc: x / Ketone: Negative  / Bili: Negative / Urobili: <2 mg/dL   Blood: x / Protein: 30 mg/dL / Nitrite: Negative   Leuk Esterase: Negative / RBC: 5-10 /HPF / WBC 4 /HPF   Sq Epi: x / Non Sq Epi: 2 /HPF / Bacteria: Few          RADIOLOGY & ADDITIONAL TESTS:  Results Reviewed:   Imaging Personally Reviewed:  Electrocardiogram Personally Reviewed:    COORDINATION OF CARE:  Care Discussed with Consultants/Other Providers [Y/N]:  Prior or Outpatient Records Reviewed [Y/N]:

## 2021-06-12 NOTE — PROGRESS NOTE ADULT - PROBLEM SELECTOR PLAN 6
- chronic anemia  - iron studies reviewed  - family requesting IV iron, s/p Venofer 100mg daily x 3 days

## 2021-06-13 NOTE — PROGRESS NOTE ADULT - PROBLEM SELECTOR PLAN 3
Recurrent sepsis (hypothermia, leukocytosis) likely d/t aspiration PNA   CXR shows R infiltrate   Continue w/ Zosyn

## 2021-06-13 NOTE — PROGRESS NOTE ADULT - PROBLEM SELECTOR PLAN 6
Patient w/ sepsis on admission d/t UTI, sepsis now resolved. Recent ED visit for UTI. urine culture at that time showing pansensitive e.coli.   CT a/p showing cystitis   - s/p Ceftriaxone   - Bcx NGTD, Ucx with normal  caridad

## 2021-06-13 NOTE — PROGRESS NOTE ADULT - SUBJECTIVE AND OBJECTIVE BOX
PROGRESS NOTE:     Patient is a 89y old  Female who presents with a chief complaint of Dehydration (2021 16:08)      SUBJECTIVE / OVERNIGHT EVENTS: No acute events.     ADDITIONAL REVIEW OF SYSTEMS:    MEDICATIONS  (STANDING):  heparin   Injectable 5000 Unit(s) SubCutaneous every 12 hours  hydrALAZINE Injectable 10 milliGRAM(s) IV Push every 6 hours  piperacillin/tazobactam IVPB.. 3.375 Gram(s) IV Intermittent every 12 hours    MEDICATIONS  (PRN):      CAPILLARY BLOOD GLUCOSE        I&O's Summary    2021 07:01  -  2021 07:00  --------------------------------------------------------  IN: 1260 mL / OUT: 375 mL / NET: 885 mL        PHYSICAL EXAM:  Vital Signs Last 24 Hrs  T(C): 37.2 (2021 09:30), Max: 37.2 (2021 09:30)  T(F): 99 (2021 09:30), Max: 99 (2021 09:30)  HR: 110 (2021 09:30) (97 - 110)  BP: 168/66 (2021 09:30) (136/58 - 168/66)  BP(mean): --  RR: 18 (2021 09:30) (17 - 18)  SpO2: 98% (2021 09:30) (98% - 100%)    CONSTITUTIONAL: cachectic, elderly woman, +NGT   HEENT: sclera clear, dry MMM  RESPIRATORY: Normal respiratory effort; lungs are clear to auscultation bilaterally  CARDIOVASCULAR: S1/S2, RRR, no murmurs appreciated; No lower extremity edema  ABDOMEN: soft, nontender, nondistended, normoactive bowel sounds, no rebound/guarding  NEUROLOGY: awake, not answering questions, opens eyes, moves extremities   SKIN: No rashes; no palpable lesions    LABS:        122<L>  |  91<L>  |  105<H>  ----------------------------<  94  4.2   |  11<L>  |  5.29<H>    Ca    8.0<L>      2021 07:40  Phos  6.5     06-13  Mg     2.0     06-13            Urinalysis Basic - ( 2021 16:22 )    Color: Light Yellow / Appearance: Clear / S.014 / pH: x  Gluc: x / Ketone: Negative  / Bili: Negative / Urobili: <2 mg/dL   Blood: x / Protein: 30 mg/dL / Nitrite: Negative   Leuk Esterase: Negative / RBC: 5-10 /HPF / WBC 4 /HPF   Sq Epi: x / Non Sq Epi: 2 /HPF / Bacteria: Few        Culture - Blood (collected 2021 18:59)  Source: .Blood Blood-Venous  Preliminary Report (2021 19:01):    No growth to date.    Culture - Blood (collected 2021 18:57)  Source: .Blood Blood-Peripheral  Preliminary Report (2021 19:01):    No growth to date.        RADIOLOGY & ADDITIONAL TESTS:  Results Reviewed:   Imaging Personally Reviewed:  Electrocardiogram Personally Reviewed:    COORDINATION OF CARE:  Care Discussed with Consultants/Other Providers [Y/N]:  Prior or Outpatient Records Reviewed [Y/N]:

## 2021-06-14 NOTE — PROGRESS NOTE ADULT - SUBJECTIVE AND OBJECTIVE BOX
Met with patient's daughters at bedside, they opted to have grandson translate instead of  services. Discussed current medical problems and potential disease trajectories. They are worried about causing more suffering but they also state that the patient was very functional prior to this admission so it is difficult to accept such a rapid decline. They would like to give the patient a few days to see if Abx and electrolyte corrections can improve her mental status. They are amenable to better pain control in the meantime. We decided that if the NGT comes out again, that we would not replace it.  Briefly introduced the role of hospice services but they are overwhelmed so will defer pending clinical course. Do not expect patient's status to change significant given the persistence of her renal failure.    ordered Tylenol 650mg via NGT q8h ATC x2 days  ordered Dilaudid 0.2mg IV q8h ATC (with hold parameters)  ordered Dilaudid 0.2mg IV q4h PRN for Moderate/Severe Pain [facility]  Puneet Sandoval, Palliative Care Attending  Contact Info: Page [phone] (including Nights/Weekend), message on Microsoft Teams (Puneet Sandoval), or leave VM at Palliative Office [phone] (Non-Urgent)    SUBJECTIVE AND OBJECTIVE:  INTERVAL HPI/OVERNIGHT EVENTS: Interval events noted. Patient awake but uncomfortable and fidgeting. Daughters at bedside, discussed goals and plan. Offered  services but they declined and had patient's grandson translate over the phone. Patient received no PRNs in past 24hrs.    Allergies  No Known Allergies    MEDICATIONS  (STANDING):  acetaminophen    Suspension .. 650 milliGRAM(s) Enteral Tube every 8 hours  heparin   Injectable 5000 Unit(s) SubCutaneous every 12 hours  hydrALAZINE Injectable 10 milliGRAM(s) IV Push every 6 hours  HYDROmorphone  Injectable 0.2 milliGRAM(s) IV Push every 8 hours  piperacillin/tazobactam IVPB.. 3.375 Gram(s) IV Intermittent every 12 hours  sodium bicarbonate 650 milliGRAM(s) Oral every 8 hours    MEDICATIONS  (PRN):  HYDROmorphone  Injectable 0.2 milliGRAM(s) IV Push every 4 hours PRN Moderate/Severe Pain      ITEMS UNCHECKED ARE NOT PRESENT    PRESENT SYMPTOMS: [x]Unable to obtain due to poor mentation   Source if other than patient:  [x]Family   []Team     Pain: [x] yes [ ] no  QOL impact -   Location -                    Aggravating factors -  Quality -  Radiation -  Timing -  Severity (0-10 scale):  Minimal acceptable level (0-10 scale):    PAIN AD Score: 4  http://geriatrictoolkit.[place].[email or web address]/[email or web address] (press ctrl +  left click to view)    Dyspnea:                           []Mild  []Moderate []Severe  Anxiety:                             []Mild []Moderate []Severe  Fatigue:                             []Mild []Moderate []Severe  Nausea:                             []Mild []Moderate []Severe  Loss of appetite:              []Mild []Moderate []Severe  Constipation:                    []Mild []Moderate []Severe    Other Symptoms:  []All other review of systems negative     Palliative Performance Status Version 2: 20%    PHYSICAL EXAM:  GENERAL:  []Alert  []Oriented x   [x]Lethargic  []Cachexia  []Unarousable  [x]Verbal  []Non-Verbal  Behavioral:   [] Anxiety  [x] Delirium [] Agitation [] Other  HEENT:  []Normal   [x]Dry mouth   []ET Tube/Trach  []Oral lesions  PULMONARY:   [x]Clear []Tachypnea  []Audible excessive secretions   []Rhonchi        []Right []Left []Bilateral  []Crackles        []Right []Left []Bilateral  []Wheezing     []Right []Left []Bilateral  CARDIOVASCULAR:    [x]Regular []Irregular []Tachy  []Titus []Murmur []Other  GASTROINTESTINAL:  [x]Soft  []Distended   [x]+BS  [x]Non tender []Tender  []PEG [x]OGT/ NGT  Last BM:     GENITOURINARY:  []Normal [x] Incontinent   []Oliguria/Anuria   []Purcell  MUSCULOSKELETAL:   []Normal   []Weakness  [x]Bed/Wheelchair bound []Edema  NEUROLOGIC:   []No focal deficits  [x] Cognitive impairment  [x] Dysphagia []Dysarthria [] Paresis []Other   SKIN:   [x]Normal   []Pressure ulcer(s)  []Rash    Vital Signs Last 24 Hrs  T(C): 36.3 (14 Jun 2021 13:04), Max: 37.2 (13 Jun 2021 15:49)  T(F): 97.4 (14 Jun 2021 13:04), Max: 98.9 (13 Jun 2021 15:49)  HR: 100 (14 Jun 2021 13:04) (78 - 100)  BP: 146/70 (14 Jun 2021 13:04) (141/66 - 157/82)  BP(mean): --  RR: 18 (14 Jun 2021 13:04) (16 - 18)  SpO2: 100% (14 Jun 2021 13:04) (97% - 100%) I&O's Summary    13 Jun 2021 07:01  -  14 Jun 2021 07:00  --------------------------------------------------------  IN: 480 mL / OUT: 350 mL / NET: 130 mL    14 Jun 2021 07:01  -  14 Jun 2021 15:16  --------------------------------------------------------  IN: 0 mL / OUT: 350 mL / NET: -350 mL        LABS:   06-14    126<L>  |  94<L>  |  109<H>  ----------------------------<  92  4.1   |  11<L>  |  5.63<H>    Ca    7.6<L>      14 Jun 2021 06:23  Phos  8.1     06-14  Mg     2.1     06-14    RADIOLOGY & ADDITIONAL STUDIES: None new    PROTEIN CALORIE MALNUTRITION PRESENT: [ ]mild [ ]moderate [ ]severe [ ]underweight [ ]morbid obesity  [] PPSV2 < or = 30% [] significant weight loss [] poor nutritional intake [] anasarca [] catabolic state   Albumin, Serum: 3.0 g/dL (06-04-21 @ 19:10)   Artificial Nutrition []     REFERRALS:   []Chaplaincy  []Hospice  []Child Life  [x]Social Work  []Case management []Holistic Therapy []Physical Therapy []Dietary

## 2021-06-14 NOTE — CHART NOTE - NSCHARTNOTEFT_GEN_A_CORE
Nutrition Consult X Tube Feeding   Source:        other [X] Nurse, Medical Chart   Diet rx: NPO with Tube Feed: Tube Feeding Modality: Nasogastric;   Jevity 1.2 Scottie (JEVITY1.2RTH)  Total Volume for 24 Hours (mL): 240    Continuous  Starting Tube Feed Rate {mL per Hour}: 10  Increase Tube Feed Rate by (mL): 0      Until Goal Tube Feed Rate (mL per Hour): 10  Tube Feed Duration (in Hours): 24        Tube Feed Start Time: 16:15 (06-12-21 @ 16:09) [Active]    Pt 89 yo female with PMHx of anemia, recent UTI from home for weakness, lethargy; Pt found to have sepsis 2/2 UTI, Severe dehydration, Acute renal failure - per chart review.   Of note, Pt with FTT (failure to thrive) in adult; Pt also with Dysphagia, Pt failed S&S; NGT placed for tube feeds. DNR/DNI status noted. Pt's family likely to accept pleasure feeds in the long term; Palliative care to follow up.     At time of visit, Pt appears restless/agitated. Pt NPO with Tube Feeding via NGT: Jevity 1.2cal @ 10 ml/hr, infusing at time of visit. Per nurse, Pt having multiple bowel movements. Will recommend to add Lactobacillus Acidophilus (probiotic supplementation) to promote improved gut function. Of note, Pt with pressure injury to L upper buttock, unstageable. Will recommend to add No Carb Prosource 1 pkg daily; Will recommend to add liquid Multivitamins with minerals daily for micronutrient coverage as well. Case discussed with nurse. RDN remains available.      Pt's weight: 89.7#/40.7 Kg (6/5)  Pertinent Medications: Heparin,   Pertinent Labs:  06-14 Na126 mmol/L<L> Glu 92 mg/dL K+ 4.1 mmol/L Cr  5.63 mg/dL<H>  mg/dL<H> 06-14 Phos 8.1 mg/dL<H>  Skin: per flow sheet, L upper buttock - un stageable; non-pitting edema to r/l arm     Estimated Needs: [X] no change since previous assessment (9490-2680 Kcal/day; 73-81 gm Protein/day)   Previous Nutrition Diagnosis: [X] Malnutrition, severe  Nutrition Diagnosis is [X] ongoing      Nutrition Interventions/recommendations:  1. Increase TF rate by 10ml every shift or as tolerated to goal rate of Jevity 1.2cal @ 50 ml/hr (to provide ~1200ml formula, ~1440Kcal, ~66.6 gm Protein in 24 hrs); Add No Carb Prosource (1 pkg = 15 gms Protein) Qty per day: 1 (to provide total of 81.6 gm protein daily);  2. Monitor Tube Feeding tolerance; Add free water flushes per MD discretion;   3. Add Liquid Multivitamins with minerals daily for micronutrient coverage;     4. Monitor labs, weekly weights, hydration status;   5. Defer long term nutrition plan of care to MD based on GOC discussion and decisions of Pt's family;    RDN remains available

## 2021-06-14 NOTE — PROGRESS NOTE ADULT - PROBLEM SELECTOR PLAN 3
.  Significant departure from pre-hospital baseline  -possibly exacerbated by infection and pain but uremia is also contributing and has only improved slightly  -frequent re-orientation, pain control, supportive care to optimize mental status

## 2021-06-14 NOTE — PROGRESS NOTE ADULT - PROBLEM SELECTOR PLAN 7
.  Complex medical decision making / symptom management in the setting of advanced illness.    Will continue to follow for ongoing GOC discussion.   Emotional support provided, questions answered.  Active Psychosocial Referrals: LUZ CASTANEDA    For new or uncontrolled symptoms, please page the Palliative Medicine team ([facility] #[id number]).   The service is available 24/7 (including nights & weekends) to provide symptom management recommendations over the phone as appropriate

## 2021-06-14 NOTE — PROGRESS NOTE ADULT - PROBLEM SELECTOR PLAN 6
.  In addition to the EM visit, an advance care planning meeting was performed.  Start time: 12:00PM  End time: 12:16PM  Total time: 16min  A face to face meeting to discuss advance care planning was held today regarding: GO DELGADO  Primary decision maker: Patient is unable to participate in decision making  Alternate/surrogate: Delmy Cox  Discussed advance directives including, but not limited to, healthcare proxy and code status.  Decision regarding code status: DNR/DNI  Documentation completed today: [facility] note

## 2021-06-14 NOTE — PROGRESS NOTE ADULT - CONVERSATION DETAILS
Met with patient's daughters at bedside, they opted to have grandson translate instead of  services. Discussed current medical problems and potential disease trajectories. They are worried about causing more suffering but they also state that the patient was very functional prior to this admission so it is difficult to accept such a rapid decline. They would like to give the patient a few days to see if Abx and electrolyte corrections can improve her mental status. They are amenable to better pain control in the meantime which could also be exacerbating her delirium. We decided that if the NGT comes out again, that we would not replace it.  Briefly introduced the role of hospice services but they are overwhelmed so will defer pending clinical course. Do not expect patient's poor prognosis to change significantly given the persistence of her renal failure.

## 2021-06-14 NOTE — PROGRESS NOTE ADULT - SUBJECTIVE AND OBJECTIVE BOX
PROGRESS NOTE:     Patient is a 89y old  Female who presents with a chief complaint of Dehydration (14 Jun 2021 12:27)      SUBJECTIVE / OVERNIGHT EVENTS: No acute events. Pt continues to be lethargic. +Secretions as per RN.     ADDITIONAL REVIEW OF SYSTEMS:    MEDICATIONS  (STANDING):  acetaminophen    Suspension .. 650 milliGRAM(s) Enteral Tube every 8 hours  heparin   Injectable 5000 Unit(s) SubCutaneous every 12 hours  hydrALAZINE Injectable 10 milliGRAM(s) IV Push every 6 hours  HYDROmorphone  Injectable 0.2 milliGRAM(s) IV Push every 8 hours  piperacillin/tazobactam IVPB.. 3.375 Gram(s) IV Intermittent every 12 hours    MEDICATIONS  (PRN):  HYDROmorphone  Injectable 0.2 milliGRAM(s) IV Push every 4 hours PRN Moderate/Severe Pain      CAPILLARY BLOOD GLUCOSE      POCT Blood Glucose.: 100 mg/dL (14 Jun 2021 11:54)    I&O's Summary    13 Jun 2021 07:01  -  14 Jun 2021 07:00  --------------------------------------------------------  IN: 480 mL / OUT: 350 mL / NET: 130 mL    14 Jun 2021 07:01  -  14 Jun 2021 14:52  --------------------------------------------------------  IN: 0 mL / OUT: 350 mL / NET: -350 mL        PHYSICAL EXAM:  Vital Signs Last 24 Hrs  T(C): 36.3 (14 Jun 2021 13:04), Max: 37.2 (13 Jun 2021 15:49)  T(F): 97.4 (14 Jun 2021 13:04), Max: 98.9 (13 Jun 2021 15:49)  HR: 100 (14 Jun 2021 13:04) (78 - 100)  BP: 146/70 (14 Jun 2021 13:04) (141/66 - 157/82)  BP(mean): --  RR: 18 (14 Jun 2021 13:04) (16 - 18)  SpO2: 100% (14 Jun 2021 13:04) (97% - 100%)    CONSTITUTIONAL: cachectic, elderly woman, +NGT   HEENT: sclera clear, dry MMM  RESPIRATORY: Normal respiratory effort; lungs are clear to auscultation anteriorly   CARDIOVASCULAR: S1/S2, RRR, no murmurs appreciated; No lower extremity edema  ABDOMEN: soft, nontender, nondistended, normoactive bowel sounds, no rebound/guarding  NEUROLOGY: Lethargic, not answering questions, opens eyes, withdraws to pain   SKIN: No rashes; no palpable lesions    LABS:    06-14    126<L>  |  94<L>  |  109<H>  ----------------------------<  92  4.1   |  11<L>  |  5.63<H>    Ca    7.6<L>      14 Jun 2021 06:23  Phos  8.1     06-14  Mg     2.1     06-14                Culture - Blood (collected 11 Jun 2021 18:59)  Source: .Blood Blood-Venous  Preliminary Report (12 Jun 2021 19:01):    No growth to date.    Culture - Blood (collected 11 Jun 2021 18:57)  Source: .Blood Blood-Peripheral  Preliminary Report (12 Jun 2021 19:01):    No growth to date.        RADIOLOGY & ADDITIONAL TESTS:  Results Reviewed:   Imaging Personally Reviewed:  Electrocardiogram Personally Reviewed:    COORDINATION OF CARE:  Care Discussed with Consultants/Other Providers [Y/N]:  Prior or Outpatient Records Reviewed [Y/N]:

## 2021-06-15 NOTE — PROVIDER CONTACT NOTE (OTHER) - NAME OF MD/NP/PA/DO NOTIFIED:
ACP Anamaria Ez
Amado Gavin
ANNAMARIA Cunha
Kristina Monsalve ACP
Leyda Joseph
ANNAMARIA Ruiz [id number]

## 2021-06-15 NOTE — PROVIDER CONTACT NOTE (CRITICAL VALUE NOTIFICATION) - ASSESSMENT
Pt. is A&Ox0, nonverbal. Bedbound with EMERSON.
pt nonverbal and confused at baseline, no signs of distress noted
Troponin 193 was reported by lab, pt is resting comfortably, not in any acute distress
no change in status. awaiting bed in nad with daughter at bedside
pt aox0, nonverbal, resting in bed, no signs or symptoms of distress noted

## 2021-06-15 NOTE — PROVIDER CONTACT NOTE (CRITICAL VALUE NOTIFICATION) - BACKGROUND
89y/o F admitted with sepsis 2/2 UTI, dehydration, and acute renal failure.
pt admitted for hypernatremia
pt admitted for sepsis d/t UTI, also found to be hypernatremic, pt on D5 IVF @50ml/hr. BMP to be drawn q6hr
pt admitted for sepsis d/t UTI, also hypernatremia

## 2021-06-15 NOTE — PROGRESS NOTE ADULT - PROBLEM SELECTOR PLAN 3
.  Significant departure from pre-hospital baseline  -related to infection, pain, and renal failure  -c/w supportive care

## 2021-06-15 NOTE — PROVIDER CONTACT NOTE (OTHER) - BACKGROUND
Patient admitted for UTI with sepsis. Patient NPO with 2 failed speech and swallow evaluations. NGT placed on 6/10 for tube feedings and free water.
pt admitted for sepsis d/t UTI, also hypernatremia
89y/o M admitted with sepsis 2/2 UTI, severe dehydration, and acute renal failure.
admitted for failure to thrive
pt admitted for sepsis d/t UTI, also found to be hypernatremic, pt on D5 IVF @50ml/hr. BMP to be drawn q8hr
admitted with sepsis, dehydration, acute renal failure. NPO with tube feeds running via NG tube. Tube feeds were started last night at 8pm and now running at goal.

## 2021-06-15 NOTE — PROVIDER CONTACT NOTE (OTHER) - SITUATION
pt remains /108
patient hypertensive 176/99
Pt. tachycardic and EMERSON, aspirated.
Patient with unsecured mittens self removed NGT.
Patient's BP is 180/110mmHg
Patient with one episode of vomiting.

## 2021-06-15 NOTE — PROGRESS NOTE ADULT - PROBLEM SELECTOR PROBLEM 9
FTT (failure to thrive) in adult
Discharge planning issues
FTT (failure to thrive) in adult
Dysphagia, unspecified type
Essential hypertension
FTT (failure to thrive) in adult
Essential hypertension
Dysphagia, unspecified type

## 2021-06-15 NOTE — PROVIDER CONTACT NOTE (OTHER) - REASON
HTN
Pt. tachycardic and EMERSON, aspirated.
Vomiting
Patient's BP is 180/110mmHg
Hypertension
Patient removed NGT

## 2021-06-15 NOTE — PROVIDER CONTACT NOTE (OTHER) - RECOMMENDATIONS
2.5mg IVP metoprolol given as ordered
unsecured mittens removed. Patient remains NPO with IVF. ACP Leyda Lux to come and assess the patient.
Orally suctioned, oral hygiene provided. Tube feeds on hold as per ACP.
Placed Pt. on 2L via NC. prn pain medication given. ACP notified.
Stat IVP 2.5mg metoprolol

## 2021-06-15 NOTE — PROGRESS NOTE ADULT - PROBLEM SELECTOR PLAN 10
- in setting of encephalopathy   - S/S evaluation: currently PO nutrition contraindicated  - trial of NGT feeds  - Family likely to accept pleasure feeds in the long term  - Palliative care f/up
- in setting of encephalopathy   - S/S evaluation: currently PO nutrition contraindicated  - trial of NGT feeds, will not replace NGT if it comes out  - Family likely to accept pleasure feeds in the long term  - Palliative care f/up regarding GOC/hospice
- in setting of encephalopathy   - S/S evaluation: currently PO nutrition contraindicated  - NPO/IVF   - family does not want NGT re-inserted
- in setting of encephalopathy   - S/S evaluation: currently PO nutrition contraindicated  - trial of NGT feeds  - Family likely to accept pleasure feeds in the long term  - Palliative care f/up
DVT: HSQ  Diet: NPO   Dispo: pending clinical improvement  GOC: DNR/DNI, see GOC chart note, MOLST in chart
- in setting of encephalopathy   - S/S evaluation, did not pass  - Family accepting pleasure feeds in the long term  - Appreciate Palliative care recs  - Comfort measures
DVT: HSQ  Diet: NPO   Dispo: pending clinical improvement  GOC: DNR/DNI, see GOC chart note, MOLST in chart

## 2021-06-15 NOTE — PROVIDER CONTACT NOTE (OTHER) - ACTION/TREATMENT ORDERED:
2.5mg metoprolol IVP
Per ACP S. Ez, tube feedings stopped. Monitor residual and pending CXR to evaluate for aspiration PNA.
Orally suctioned, oral hygiene provided. Tube feeds on hold as per ACP.
no further orders at this time
unsecured mittens removed. Patient remains NPO with IVF.
Reassess patient's BP in half an hour, notify provider

## 2021-06-15 NOTE — PROGRESS NOTE ADULT - SUBJECTIVE AND OBJECTIVE BOX
Extended conversation with daughters at bedside re: prognosis and expected disease trajectory. We discussed the patient's persistent renal failure, inability to tolerate feeds, and general decline as signs that she is entering the dying process. They understand this and would like to begin transitioning to comfort/symptom-directed care. They would like to continue with IV Abx for now but would otherwise like to start de-escalating care.    ·	discontinue blood draws and FS  ·	stop tube feeds  ·	ordered Dilaudid 0.2mg IV q6h ATC  ·	ordered Dilaudid 0.2mg IV q2h PRN for Pain and/or Dyspnea  ·	ordered Ativan 0.5mg IV q4h PRN for Anxiety/Agitation Extended conversation with daughters at bedside re: prognosis and expected disease trajectory. We discussed the patient's persistent renal failure, inability to tolerate feeds, and general decline as signs that she is entering the dying process. They understand this and would like to begin transitioning to comfort/symptom-directed care. They would like to continue with IV Abx for now but would otherwise like to start de-escalating care.    ·	discontinue blood draws and FS; stop tube feeds  ·	asked floor to allow EOL visitation  ·	broached potential transfer to inpatient hospice but family to overwhelmed to consider right now  ·	ordered Dilaudid 0.2mg IV q6h ATC  ·	ordered Dilaudid 0.2mg IV q2h PRN for Pain and/or Dyspnea  ·	ordered Ativan 0.5mg IV q4h PRN for Anxiety/Agitation Extended conversation with daughters at bedside re: prognosis and expected disease trajectory. We discussed the patient's persistent renal failure, inability to tolerate feeds, and general decline as signs that she is entering the dying process. They understand this and would like to begin transitioning to comfort/symptom-directed care. They would like to continue with IV Abx for now but would otherwise like to start de-escalating care.    ·	discontinue blood draws/FS/HSQ; stop tube feeds and allow pleasure feeds  ·	asked floor to allow EOL visitation  ·	broached potential transfer to inpatient hospice but family to overwhelmed to consider right now  ·	ordered Dilaudid 0.2mg IV q6h ATC  ·	ordered Dilaudid 0.2mg IV q2h PRN for Pain and/or Dyspnea  ·	ordered Ativan 0.5mg IV q4h PRN for Anxiety/Agitation [facility] Geriatric[facility] and Palliative Care  Puneet Sandoval, Palliative Care Attending  Contact Info: Page [phone] (including Nights/Weekend), message on Microsoft Teams (Puneet Sandoval), or leave VM at Palliative Office [phone] (Non-Urgent)    SUBJECTIVE AND OBJECTIVE:  INTERVAL HPI/OVERNIGHT EVENTS: Interval events noted. Patient overtly comfortable, intermittent rapid breathing.  Services ID #[id number]. Extended conversation with daughters at bedside re: prognosis and expected disease trajectory. We discussed the patient's persistent renal failure, inability to tolerate feeds, and general decline as signs that she is entering the dying process. They understand this and would like to begin transitioning to comfort/symptom-directed care. They would like to continue with IV Abx for now but would otherwise like to start de-escalating care. Patient required PRNs of Dilaudid x2 in past 24hrs. No unexpected adverse effects of opiates noted.    Allergies  No Known Allergies    MEDICATIONS  (STANDING):  acetaminophen    Suspension .. 650 milliGRAM(s) Enteral Tube every 8 hours  hydrALAZINE Injectable 10 milliGRAM(s) IV Push every 6 hours  HYDROmorphone  Injectable 0.2 milliGRAM(s) IV Push every 6 hours  piperacillin/tazobactam IVPB.. 3.375 Gram(s) IV Intermittent every 12 hours    MEDICATIONS  (PRN):  HYDROmorphone  Injectable 0.2 milliGRAM(s) IV Push every 2 hours PRN Moderate/Severe Pain  LORazepam   Injectable 0.5 milliGRAM(s) IV Push every 4 hours PRN Anxiety/Agitation      ITEMS UNCHECKED ARE NOT PRESENT    PRESENT SYMPTOMS: [x]Unable to obtain due to poor mentation   Source if other than patient:  [x]Family   []Team     Pain: [x] yes [ ] no  QOL impact -   Location -                    Aggravating factors -  Quality -  Radiation -  Timing -  Severity (0-10 scale):  Minimal acceptable level (0-10 scale):    PAIN AD Score: 1  [email or web address]/[email or web address] (press ctrl +  left click to view)    Dyspnea:                           []Mild  []Moderate []Severe  Anxiety:                             []Mild []Moderate []Severe  Fatigue:                             []Mild []Moderate []Severe  Nausea:                             []Mild []Moderate []Severe  Loss of appetite:              []Mild []Moderate []Severe  Constipation:                    []Mild []Moderate []Severe    Other Symptoms:  []All other review of systems negative     Palliative Performance Status Version 2: 10%    PHYSICAL EXAM:  GENERAL:  []Alert  []Oriented x   [x]Lethargic  []Cachexia  []Unarousable  [x]Verbal  []Non-Verbal  Behavioral:   [] Anxiety  [x] Delirium [] Agitation [] Other  HEENT:  []Normal   [x]Dry mouth   []ET Tube/Trach  []Oral lesions  PULMONARY:   [x]Clear []Tachypnea  []Audible excessive secretions   []Rhonchi        []Right []Left []Bilateral  []Crackles        []Right []Left []Bilateral  []Wheezing     []Right []Left []Bilateral  CARDIOVASCULAR:    [x]Regular []Irregular []Tachy  []Titus []Murmur []Other  GASTROINTESTINAL:  [x]Soft  []Distended   [x]+BS  [x]Non tender []Tender  []PEG [x]OGT/ NGT  Last BM:   GENITOURINARY:  []Normal [x] Incontinent   []Oliguria/Anuria   []Purcell  MUSCULOSKELETAL:   []Normal   []Weakness  [x]Bed/Wheelchair bound []Edema  NEUROLOGIC:   []No focal deficits  [x] Cognitive impairment  [x] Dysphagia []Dysarthria [] Paresis []Other   SKIN:   [x]Normal   []Pressure ulcer(s)  []Rash      Vital Signs Last 24 Hrs  T(C): 36.3 (15 Jermain 2021 11:51), Max: 36.3 (15 Jermain 2021 11:51)  T(F): 97.4 (15 Jermain 2021 11:51), Max: 97.4 (15 Jermain 2021 11:51)  HR: 100 (15 Jermain 2021 11:51) (96 - 121)  BP: 154/77 (15 Jermain 2021 11:51) (133/67 - 168/130)  BP(mean): --  RR: 20 (15 Jermain 2021 11:51) (20 - 32)  SpO2: 100% (15 Jermain 2021 11:51) (90% - 100%) I&O's Summary    14 Jun 2021 07:01  -  15 Jermain 2021 07:00  --------------------------------------------------------  IN: 295 mL / OUT: 400 mL / NET: -105 mL      LABS:   06-15    126<L>  |  95<L>  |  112<H>  ----------------------------<  105<H>  4.2   |  10<LL>  |  5.96<H>    Ca    8.2<L>      15 Jermain 2021 04:22  Phos  9.1     06-15  Mg     2.1     06-15    RADIOLOGY & ADDITIONAL STUDIES: None new    PROTEIN CALORIE MALNUTRITION PRESENT: [ ]mild [ ]moderate [ ]severe [ ]underweight [ ]morbid obesity  [] PPSV2 < or = 30% [] significant weight loss [] poor nutritional intake [] anasarca [] catabolic state   Albumin, Serum: 3.0 g/dL (06-04-21 @ 19:10)   Artificial Nutrition []     REFERRALS:   []Chaplaincy  []Hospice  []Child Life  [x]Social Work  [x]Case management []Holistic Therapy []Physical Therapy []Dietary

## 2021-06-15 NOTE — PROGRESS NOTE ADULT - PROBLEM SELECTOR PLAN 7
.  Complex medical decision making / symptom management in the setting of advanced illness.    Will continue to follow for ongoing titration of palliative regimen.   Emotional support provided, questions answered.  Active Psychosocial Referrals: LUZ CASTANEDA    For new or uncontrolled symptoms, please page the Palliative Medicine team ([facility] #[phone]).   The service is available 24/7 (including nights & weekends) to provide symptom management recommendations over the phone as appropriate

## 2021-06-15 NOTE — PROGRESS NOTE ADULT - PROBLEM SELECTOR PLAN 9
- in setting of encephalopathy   - S/S evaluation: currently PO nutrition contraindicated  - NPO/IVF   - family does not want NGT
Patient visibly cachectic, decreased PO intake likely in setting of infection and advanced dementia  - CTH with advanced microvascular type changes  - NGT placed and getting tube feeds as tolerated
DVT: HSQ  Diet: NPO   Dispo: pending clinical improvement  GOC: DNR/DNI, see GOC chart note, MOLST in chart
- Hydralazine prn   - monitor BP
- Hydralazine prn   - monitor BP
- in setting of encephalopathy   - S/S evaluation: currently PO nutrition contraindicated  - NPO/IVF   - family does not want NGT
Patient visibly cachectic, decreased PO intake likely in setting of infection and advanced dementia  - CTH with advanced microvascular type changes  - NGT placed and getting tube feeds as tolerated
Patient visibly cachectic, decreased PO intake likely in setting of infection and advanced dementia  - CTH with advanced microvascular type changes  - NGT placed 6/10 for nutrition/hydration, patient pulled out NGT and family does not want tube reinserted   - Family likely to accept pleasure feeds in the long term

## 2021-06-15 NOTE — CHART NOTE - NSCHARTNOTEFT_GEN_A_CORE
Late note entry    Notified by RN overnight that pt tachycardic to 120 and desatted to 80s on RA, also with coarse lung sounds, was on continuous TF. Was NT suctioned by RN and placed on 2L NC O2 with improvement. TF were held for 2 hours, resp status improved. Residual checked was less than 50cc. Pt currently on zosyn for infiltrate on previous CXR. Will continue holding TF for now and monitoring O2 sat. RN to continue NT suctioning prn. Pt still hypothermic. Repeat CXR ordered for AM to assess for worsening pna. Currently DNR/DNI, Need GOC clarified with family re: continuation of TF or pleasure feeds if made comfort care.     Vital Signs Last 24 Hrs  T(C): 35.8 (15 Jermain 2021 04:41), Max: 36.4 (14 Jun 2021 20:21)  T(F): 96.4 (15 Jermain 2021 04:41), Max: 97.6 (14 Jun 2021 20:21)  HR: 97 (15 Jermain 2021 05:17) (91 - 119)  BP: 141/71 (15 Jermain 2021 05:17) (133/67 - 157/82)  BP(mean): --  RR: 20 (15 Jermain 2021 05:17) (16 - 20)  SpO2: 95% (15 Jermain 2021 05:17) (94% - 100%)    Will continue to monitor.  HAIM Hui PA-C  [id number]

## 2021-06-15 NOTE — PROVIDER CONTACT NOTE (OTHER) - DATE AND TIME:
05-Jun-2021 06:40
12-Jun-2021 07:42
05-Jun-2021 17:23
05-Jun-2021 09:05
15-Jermain-2021 03:30
10-Jermain-2021 22:00

## 2021-06-15 NOTE — PROGRESS NOTE ADULT - PROBLEM SELECTOR PROBLEM 10
Dysphagia, unspecified type
Discharge planning issues
Discharge planning issues
Dysphagia, unspecified type

## 2021-06-15 NOTE — PROGRESS NOTE ADULT - SUBJECTIVE AND OBJECTIVE BOX
[facility] Division of Hospital Medicine  Giorgio Robin MD  Pager [phone]    Patient is a 89y old  Female who presents with a chief complaint of Dehydration (15 Jermain 2021 14:13)      SUBJECTIVE / OVERNIGHT EVENTS: Comfortable      MEDICATIONS  (STANDING):  acetaminophen    Suspension .. 650 milliGRAM(s) Enteral Tube every 8 hours  hydrALAZINE Injectable 10 milliGRAM(s) IV Push every 6 hours  HYDROmorphone  Injectable 0.2 milliGRAM(s) IV Push every 6 hours  piperacillin/tazobactam IVPB.. 3.375 Gram(s) IV Intermittent every 12 hours    MEDICATIONS  (PRN):  HYDROmorphone  Injectable 0.2 milliGRAM(s) IV Push every 2 hours PRN Moderate/Severe Pain  LORazepam   Injectable 0.5 milliGRAM(s) IV Push every 4 hours PRN Anxiety/Agitation      CAPILLARY BLOOD GLUCOSE        I&O's Summary    14 Jun 2021 07:01  -  15 Jermain 2021 07:00  --------------------------------------------------------  IN: 295 mL / OUT: 400 mL / NET: -105 mL        PHYSICAL EXAM:  Vital Signs Last 24 Hrs  T(C): 36.3 (15 Jermain 2021 11:51), Max: 36.4 (14 Jun 2021 20:21)  T(F): 97.4 (15 Jermain 2021 11:51), Max: 97.6 (14 Jun 2021 20:21)  HR: 100 (15 Jermain 2021 11:51) (95 - 121)  BP: 154/77 (15 Jermain 2021 11:51) (133/67 - 168/130)  BP(mean): --  RR: 20 (15 Jermain 2021 11:51) (18 - 32)  SpO2: 100% (15 Jermain 2021 11:51) (90% - 100%)  CONSTITUTIONAL: NAD  EYES: EOMI, conjunctiva and sclera clear  ENMT: Moist oral mucosa  NECK: Supple  RESPIRATORY: Breathing unlabored, CTAB  CARDIOVASCULAR: S1S2 no MRG  ABDOMEN: Nontender to palpation, normoactive bowel sounds, no rebound/guarding  MUSCULOSKELETAL: no clubbing or cyanosis of digits  NEUROLOGY: No focal deficits   SKIN: No rashes or lesions    LABS:    06-15    126<L>  |  95<L>  |  112<H>  ----------------------------<  105<H>  4.2   |  10<LL>  |  5.96<H>    Ca    8.2<L>      15 Jermain 2021 04:22  Phos  9.1     06-15  Mg     2.1     06-15                  RADIOLOGY & ADDITIONAL TESTS:  Results Reviewed:   Imaging Personally Reviewed:  Electrocardiogram Personally Reviewed:    COORDINATION OF CARE:  Care Discussed with Consultants/Other Providers [Y/N]:  Prior or Outpatient Records Reviewed [Y/N]:    CODE: DNR/DNI

## 2021-06-15 NOTE — PROVIDER CONTACT NOTE (OTHER) - ASSESSMENT
Patient is noted to be restless, agitated and swinging arms while rubbing face
Patient with 1 episode of vomiting.
Patient's BP is 180/110mmHg, pt looks comfortable, in no acute distress at this time.
pt aox0, nonverbal, resting in bed, no signs or symptoms of distress noted
Pt. is A&Ox0, nonverbal. Pt. noted to be restless, flailing arms, moaning in bed. Tachycardic sustaining in 110's-130's with O2 sat sustaining in mid 80's.
pt in NAD, nonverbal, no signs/symptoms of distress noted

## 2021-06-15 NOTE — PROVIDER CONTACT NOTE (CRITICAL VALUE NOTIFICATION) - SITUATION
repeat BMP resulted; serum sodium 160; previously 164
Troponin 193 was reported by lab
Troponin 228
CO2 10

## 2021-06-15 NOTE — PROGRESS NOTE ADULT - PROBLEM SELECTOR PLAN 6
.  Patient is DNR/DNI, MOLST in chart  -discussed with patient's daughters at bedside, will begin transitioning towards comfort measures  -introduced the role of inpatient hospice but unsure if family will accept moving the patient

## 2021-06-15 NOTE — PROVIDER CONTACT NOTE (CRITICAL VALUE NOTIFICATION) - ACTION/TREATMENT ORDERED:
pt receiving ns@50ml/hour
none at this time
Provider noted troponin of 193. No further interventions at this time, continue to monitor.
none at this time
Per ACP S. Ez, continue sodium bicarb via NGT and trend labs.

## 2021-06-16 NOTE — PROGRESS NOTE ADULT - PROBLEM SELECTOR PLAN 7
.  Complex medical decision making / symptom management in the setting of advanced illness.    Will continue to follow for ongoing titration of palliative regimen.   Emotional support provided, questions answered.  Active Psychosocial Referrals: LUZ CASTANEDA    For new or uncontrolled symptoms, please page the Palliative Medicine team ([facility] #[phone]).   The service is available 24/7 (including nights & weekends) to provide symptom management recommendations over the phone as appropriate

## 2021-06-16 NOTE — PROGRESS NOTE ADULT - PROBLEM SELECTOR PLAN 8
DVT: HSQ  Diet: NPO until repeat s/s  Dispo: pending clinical improvement  GOC: DNR/DNI, see GOC chart note, MOLST in chart
Seen on CT  - can consider MRI after improves clinically  - unable to fully assess neuro exam
- Hydralazine prn   - monitor BP
Sinus jonathan w/ pauses, improved   - Discontinued Metoprolol   - Monitor on telemetry
Patient visibly cachectic, decreased PO intake likely in setting of infection and advanced dementia  CTH with advanced microvascular type changes  - nutrition evaluation  advance directives discussed
- in setting of encephalopathy   - S/S evaluation: currently PO nutrition contraindicated  - NPO/IVF   - family does not want NGT
- in setting of encephalopathy   - S/S evaluation, did not pass  - Family accepting pleasure feeds in the long term  - Appreciate Palliative care recs  - Comfort measures  - extensive discussion of prognosis with pt's three daughters on 6/16, they accept and understand
DVT: HSQ  Diet: NPO until repeat s/s  Dispo: pending clinical improvement  GOC: Spoke to Delmy, patient's daughter at bedside who states her and her sister are the HCP and who care for the patient at home. Wants patient to be DNR/DNI
DVT: HSQ  Diet: NPO until repeat s/s  Dispo: pending clinical improvement  GOC: DNR/DNI, see GOC chart note, MOLST in chart
Sinus jonathan w/ pauses, improved   - Discontinued Metoprolol   - Monitor on telemetry

## 2021-06-16 NOTE — PROGRESS NOTE ADULT - SUBJECTIVE AND OBJECTIVE BOX
[facility] Geriatrics and Palliative Care  Puneet Sandoval, Palliative Care Attending  Contact Info: Page [phone] (including Nights/Weekend), message on Microsoft Teams (Puneet Sandoval), or leave VM at Palliative Office [phone] (Non-Urgent)    SUBJECTIVE AND OBJECTIVE:  INTERVAL HPI/OVERNIGHT EVENTS: Interval events noted. Patient appears comfortable, in the dying phase. Patient required PRNs of Dilaudid x2 and Ativan x1 in past 24hrs. No unexpected adverse effects of opiates noted. Lengthy conversation with 3 of patient's daughter reinforcing the expected disease trajectory and impending death, they acknowledge that they are all in agreement with keeping the patient as comfortable as possible.    Allergies  No Known Allergies    MEDICATIONS  (STANDING):  hydrALAZINE Injectable 10 milliGRAM(s) IV Push every 6 hours  HYDROmorphone  Injectable 0.5 milliGRAM(s) IV Push every 6 hours  piperacillin/tazobactam IVPB.. 3.375 Gram(s) IV Intermittent every 12 hours    MEDICATIONS  (PRN):  HYDROmorphone  Injectable 0.5 milliGRAM(s) IV Push every 2 hours PRN Moderate/Severe Pain  LORazepam   Injectable 0.5 milliGRAM(s) IV Push every 4 hours PRN Anxiety/Agitation      ITEMS UNCHECKED ARE NOT PRESENT    PRESENT SYMPTOMS: [x]Unable to obtain due to poor mentation   Source if other than patient:  [x]Family   []Team     Pain: [x] yes [ ] no  QOL impact -   Location -                    Aggravating factors -  Quality -  Radiation -  Timing -  Severity (0-10 scale):  Minimal acceptable level (0-10 scale):    PAIN AD Score: 2  [email or web address].[email or web address]/painad.pdf (press ctrl +  left click to view)    Dyspnea:                           []Mild  []Moderate []Severe  Anxiety:                             []Mild []Moderate []Severe  Fatigue:                             []Mild []Moderate []Severe  Nausea:                             []Mild []Moderate []Severe  Loss of appetite:              []Mild []Moderate []Severe  Constipation:                    []Mild []Moderate []Severe    Other Symptoms:  []All other review of systems negative     Palliative Performance Status Version 2: 10%    PHYSICAL EXAM:  GENERAL:  []Alert  []Oriented x   [x]Lethargic  []Cachexia  []Unarousable  [x]Verbal  []Non-Verbal  Behavioral:   [] Anxiety  [x] Delirium [] Agitation [] Other  HEENT:  []Normal   [x]Dry mouth   []ET Tube/Trach  []Oral lesions  PULMONARY:   [x]Clear []Tachypnea  []Audible excessive secretions   []Rhonchi        []Right []Left []Bilateral  []Crackles        []Right []Left []Bilateral  []Wheezing     []Right []Left []Bilateral  CARDIOVASCULAR:    [x]Regular []Irregular []Tachy  []Titus []Murmur []Other  GASTROINTESTINAL:  [x]Soft  []Distended   [x]+BS  [x]Non tender []Tender  []PEG []OGT/ NGT  Last BM: ?  GENITOURINARY:  []Normal [] Incontinent   [x]Oliguria/Anuria   []Purcell  MUSCULOSKELETAL:   []Normal   []Weakness  [x]Bed/Wheelchair bound []Edema  NEUROLOGIC:   []No focal deficits  [x] Cognitive impairment  [x] Dysphagia []Dysarthria [] Paresis []Other   SKIN:   [x]Normal   []Pressure ulcer(s)  []Rash        Vital Signs Last 24 Hrs  T(C): 36.4 (16 Jun 2021 11:50), Max: 37.2 (16 Jun 2021 09:00)  T(F): 97.5 (16 Jun 2021 11:50), Max: 98.9 (16 Jun 2021 09:00)  HR: 98 (16 Jun 2021 11:50) (94 - 102)  BP: 179/86 (16 Jun 2021 11:50) (137/62 - 179/86)  BP(mean): --  RR: 22 (16 Jun 2021 11:50) (19 - 22)  SpO2: 94% (16 Jun 2021 11:50) (93% - 100%) I&O's Summary      LABS:   06-15    126<L>  |  95<L>  |  112<H>  ----------------------------<  105<H>  4.2   |  10<LL>  |  5.96<H>    Ca    8.2<L>      15 Jermain 2021 04:22  Phos  9.1     06-15  Mg     2.1     06-15    RADIOLOGY & ADDITIONAL STUDIES: None new    PROTEIN CALORIE MALNUTRITION PRESENT: [ ]mild [ ]moderate [ ]severe [ ]underweight [ ]morbid obesity  [] PPSV2 < or = 30% [] significant weight loss [] poor nutritional intake [] anasarca [] catabolic state   Albumin, Serum: 3.0 g/dL (06-04-21 @ 19:10)   Artificial Nutrition []     REFERRALS:   []Chaplaincy  [x]Hospice  []Child Life  [x]Social Work  []Case management []Holistic Therapy []Physical Therapy []Dietary     PALLIATIVE MEDICINE COORDINATION OF CARE DOCUMENTATION: [x] Inpatient Consult   Non-Face-to-Face prolonged service provided that relates to (face-to-face) care that has or will occur and ongoing patient management, including one or more of the following: - Reviewed documentation from other physicians and other health care professional services - Reviewed medical records and diagnostic / radiology study results - Coordination with patient's support system  ************************************************************************  MEDICATION REVIEW:  See Medication List Above  - PRN usage: Dilaudid x2 and Ativan x1  ------------------------------------------------------------------------  COORDINATION OF CARE:  - Palliative Care consulted for: [facility]  - Patient assessed: 6/15/2021  - Patient previously seen by Palliative Care service: NO    ADVANCE CARE PLANNING  - Code status: DNR/DNI  - MOLST reviewed in chart: YES  - HCP/ Surrogate: All Children acting Collectively  - [facility] document found in Alpha: NONE  - HCP/ Living will/ Other advanced directives in Alpha: NONE  ------------------------------------------------------------------------  CARE PROVIDER COORDINATION:  Extensive conversation with 3 daughters re: expected disease trajectory and prognosis. Reinforced that patient is on an unalterable course towards death. She is not an appropriate candidate for HD and will ultimately die from her renal failure. No designated HCP so all children are acting collectively as decision makers but Delmy is acting as the spokesperson. After conversation, everyone involved was in agreement with continuation of symptom-directed care. Emotional support provided, questions answered. Patient would be a candidate for inpatient hospice, unsure if the family will be willing to move the patient.  Services ID #[id number] and [id number] used.     PLAN OF CARE  - Known admissions in past year: 0  - Current admit date: 6/4/2021  - LOS: 12  - LACE score: 12  - Current dispo plan: TO BE DETERMINED -> ?inpatient hospice vs death  ------------------------------------------------------------------------  - Time Spent/Chart reviewed: 35 Minutes [including time used to gather, review and transfer data]  - Start: 12:02PM  - End: 12:37PM    Prolonged services rendered, as part of this patient's care provided by Palliative Medicine, include: i. chart review for provider and ancillary service documentation, ii. pertinent diagnostics including laboratory and imaging studies, iii. medication review including PRN use, iv. admission history including previous palliative care encounters and [facility] notes, v. advance care planning documents including HCP and MOLST forms in Alpha. Part of Palliative Medicine extended evaluation and management also involves coordination of care with our IDT, the primary and consulting vivian, and unit CM/SW and Hospice if eligible. Recommendations based on the information gathered and discussed are outline in the AP of our notes.

## 2021-06-16 NOTE — DISCHARGE NOTE NURSING/CASE MANAGEMENT/SOCIAL WORK - PATIENT PORTAL LINK FT
You can access the FollowMyHealth Patient Portal offered by [facility] by registering at the following website: [email or web address]. By joining [facility]’s FollowMyHealth portal, you will also be able to view your health information using other applications (apps) compatible with our system.

## 2021-06-16 NOTE — PROGRESS NOTE ADULT - PROBLEM SELECTOR PLAN 6
.  Patient is DNR/DNI, MOLST in chart  -discussed with patient's daughters at bedside, transitioning to comfort measures  -patient would be appropriate for inpatient hospice, referral placed by primary team  -further discussion/reinforcement noted above

## 2021-06-16 NOTE — PROGRESS NOTE ADULT - SUBJECTIVE AND OBJECTIVE BOX
[facility] Division of Hospital Medicine  Giorgio Robin MD  Pager [phone]    Patient is a 89y old  Female who presents with a chief complaint of Dehydration (16 Jun 2021 14:44)      SUBJECTIVE / OVERNIGHT EVENTS: Comfortable      MEDICATIONS  (STANDING):  hydrALAZINE Injectable 10 milliGRAM(s) IV Push every 6 hours  HYDROmorphone  Injectable 0.5 milliGRAM(s) IV Push every 6 hours  piperacillin/tazobactam IVPB.. 3.375 Gram(s) IV Intermittent every 12 hours    MEDICATIONS  (PRN):  HYDROmorphone  Injectable 0.5 milliGRAM(s) IV Push every 2 hours PRN Moderate/Severe Pain  LORazepam   Injectable 0.5 milliGRAM(s) IV Push every 4 hours PRN Anxiety/Agitation      CAPILLARY BLOOD GLUCOSE        I&O's Summary      PHYSICAL EXAM:  Vital Signs Last 24 Hrs  T(C): 36.4 (16 Jun 2021 11:50), Max: 37.2 (16 Jun 2021 09:00)  T(F): 97.5 (16 Jun 2021 11:50), Max: 98.9 (16 Jun 2021 09:00)  HR: 98 (16 Jun 2021 11:50) (94 - 102)  BP: 179/86 (16 Jun 2021 11:50) (137/62 - 179/86)  BP(mean): --  RR: 22 (16 Jun 2021 11:50) (19 - 22)  SpO2: 94% (16 Jun 2021 11:50) (93% - 100%)  CONSTITUTIONAL: NAD  EYES: EOMI, conjunctiva and sclera clear  ENMT: Moist oral mucosa  NECK: Supple  RESPIRATORY: Breathing unlabored  CARDIOVASCULAR: S1S2 no MRG  ABDOMEN: Nontender to palpation, normoactive bowel sounds, no rebound/guarding  MUSCULOSKELETAL: no clubbing or cyanosis of digits  SKIN: No rashes or lesions    LABS:    06-15    126<L>  |  95<L>  |  112<H>  ----------------------------<  105<H>  4.2   |  10<LL>  |  5.96<H>    Ca    8.2<L>      15 Jermain 2021 04:22  Phos  9.1     06-15  Mg     2.1     06-15        CODE: DNR

## 2021-06-16 NOTE — PROGRESS NOTE ADULT - PROBLEM SELECTOR PLAN 3
Due to UTI and hypernatremia/TRINH  - avoid sedating agents  - CTH with advanced microvascular type changes  - do not expect significant improvement given persistent uremia

## 2021-06-17 NOTE — PROGRESS NOTE ADULT - PROBLEM SELECTOR PLAN 6
.  Patient is DNR/DNI, MOLST in chart  -discussed with patient's daughters at bedside, transitioning to comfort measures  -patient would be appropriate for inpatient hospice, referral placed by primary team but family is unsure about moving the patient

## 2021-06-17 NOTE — PROGRESS NOTE ADULT - SUBJECTIVE AND OBJECTIVE BOX
[facility] Division of Hospital Medicine  Giorgio Robin MD  Pager [phone]    Patient is a 89y old  Female who presents with a chief complaint of Dehydration (17 Jun 2021 15:05)      SUBJECTIVE / OVERNIGHT EVENTS: Comfortable      MEDICATIONS  (STANDING):  hydrALAZINE Injectable 10 milliGRAM(s) IV Push every 6 hours  HYDROmorphone  Injectable 0.5 milliGRAM(s) IV Push every 6 hours  piperacillin/tazobactam IVPB.. 3.375 Gram(s) IV Intermittent every 12 hours    MEDICATIONS  (PRN):  glycopyrrolate Injectable 0.1 milliGRAM(s) IV Push every 4 hours PRN secretion  HYDROmorphone  Injectable 0.5 milliGRAM(s) IV Push every 2 hours PRN Moderate/Severe Pain  LORazepam   Injectable 0.5 milliGRAM(s) IV Push every 4 hours PRN Anxiety/Agitation      CAPILLARY BLOOD GLUCOSE        I&O's Summary      PHYSICAL EXAM:  Vital Signs Last 24 Hrs  T(C): 36.3 (17 Jun 2021 11:25), Max: 36.7 (16 Jun 2021 22:27)  T(F): 97.4 (17 Jun 2021 11:25), Max: 98.1 (16 Jun 2021 22:27)  HR: 99 (17 Jun 2021 11:25) (94 - 103)  BP: 156/73 (17 Jun 2021 11:25) (140/62 - 160/80)  BP(mean): --  RR: 20 (17 Jun 2021 11:25) (20 - 21)  SpO2: 90% (17 Jun 2021 11:25) (90% - 97%)  CONSTITUTIONAL: NAD  EYES: EOMI, conjunctiva and sclera clear  ENMT: Moist oral mucosa  NECK: Supple  RESPIRATORY: Breathing unlabored, CTAB  CARDIOVASCULAR: S1S2 no MRG  ABDOMEN: Nontender to palpation, normoactive bowel sounds, no rebound/guarding  MUSCULOSKELETAL: no clubbing or cyanosis of digits  NEUROLOGY: No focal deficits   SKIN: No rashes or lesions    CODE: DNR

## 2021-06-17 NOTE — PROGRESS NOTE ADULT - PROBLEM SELECTOR PLAN 7
- in setting of encephalopathy   - S/S evaluation, did not pass  - Family accepting pleasure feeds in the long term  - Appreciate Palliative care recs  - Comfort measures  - extensive discussion of prognosis with pt's three daughters on 6/16, they accept and understand  - offering inpatient hospice, family considering

## 2021-06-17 NOTE — PROGRESS NOTE ADULT - PROBLEM SELECTOR PLAN 7
.  Complex medical decision making / symptom management in the setting of advanced illness.    Will continue to follow for ongoing titration of palliative regimen.   Emotional support provided, questions answered.  Active Psychosocial Referrals: LUZ CASTANEDA    For new or uncontrolled symptoms, please page the Palliative Medicine team ([facility] #[id number]).   The service is available 24/7 (including nights & weekends) to provide symptom management recommendations over the phone as appropriate .  Complex medical decision making / symptom management in the setting of advanced illness.    Patient is in the dying phase.  Will continue to follow for ongoing titration of palliative regimen.   Emotional support provided, questions answered.  Active Psychosocial Referrals: LUZ CASTANEDA    For new or uncontrolled symptoms, please page the Palliative Medicine team ([facility] #[id number]).   The service is available 24/7 (including nights & weekends) to provide symptom management recommendations over the phone as appropriate

## 2021-06-17 NOTE — PROGRESS NOTE ADULT - SUBJECTIVE AND OBJECTIVE BOX
Patient is comfortable on current regimen. 2 daughters and 1 son at bedside, ongoing emotional support. Hospice referral pending unsure if family will be willing to transfer the patient.    ·	c/w symptom regimen as ordered [facility] Geriatrics and Palliative Care  Puneet Sandoval, Palliative Care Attending  Contact Info: Page [phone] (including Nights/Weekend), message on Microsoft Teams (Puneet Sandoval), or leave VM at Palliative Office [phone] (Non-Urgent)    SUBJECTIVE AND OBJECTIVE:  INTERVAL HPI/OVERNIGHT EVENTS: No acute events overnight. Patient is comfortable on current regimen. 2 daughters and 1 son at bedside, ongoing emotional support. Hospice referral pending unsure if family will be willing to transfer the patient. Patient required PRNs of Dilaudid x1 in past 24hrs. No unexpected adverse effects of opiates noted.    Allergies    No Known Allergies    Intolerances    MEDICATIONS  (STANDING):  hydrALAZINE Injectable 10 milliGRAM(s) IV Push every 6 hours  HYDROmorphone  Injectable 0.5 milliGRAM(s) IV Push every 6 hours  piperacillin/tazobactam IVPB.. 3.375 Gram(s) IV Intermittent every 12 hours    MEDICATIONS  (PRN):  glycopyrrolate Injectable 0.1 milliGRAM(s) IV Push every 4 hours PRN secretion  HYDROmorphone  Injectable 0.5 milliGRAM(s) IV Push every 2 hours PRN Moderate/Severe Pain  LORazepam   Injectable 0.5 milliGRAM(s) IV Push every 4 hours PRN Anxiety/Agitation      ITEMS UNCHECKED ARE NOT PRESENT    PRESENT SYMPTOMS: []Unable to obtain due to poor mentation   Source if other than patient:  []Family   []Team     PRESENT SYMPTOMS: [x]Unable to obtain due to poor mentation   Source if other than patient:  [x]Family   []Team     Pain: [x] yes [ ] no  QOL impact -   Location -                    Aggravating factors -  Quality -  Radiation -  Timing -  Severity (0-10 scale):  Minimal acceptable level (0-10 scale):    PAIN AD Score: 2  http://geriatrictoolkit.[place].[email or web address]/cog/painad.pdf (press ctrl +  left click to view)    Dyspnea:                           []Mild  []Moderate []Severe  Anxiety:                             []Mild []Moderate []Severe  Fatigue:                             []Mild []Moderate []Severe  Nausea:                             []Mild []Moderate []Severe  Loss of appetite:              []Mild []Moderate []Severe  Constipation:                    []Mild []Moderate []Severe    Other Symptoms:  []All other review of systems negative     Palliative Performance Status Version 2: 10%    PHYSICAL EXAM:  GENERAL:  []Alert  []Oriented x   [x]Lethargic  []Cachexia  []Unarousable  [x]Verbal  []Non-Verbal  Behavioral:   [] Anxiety  [x] Delirium [] Agitation [] Other  HEENT:  []Normal   [x]Dry mouth   []ET Tube/Trach  []Oral lesions  PULMONARY:   [x]Clear []Tachypnea  []Audible excessive secretions   []Rhonchi        []Right []Left []Bilateral  []Crackles        []Right []Left []Bilateral  []Wheezing     []Right []Left []Bilateral  CARDIOVASCULAR:    [x]Regular []Irregular []Tachy  []Titus []Murmur []Other  GASTROINTESTINAL:  [x]Soft  []Distended   [x]+BS  [x]Non tender []Tender  []PEG []OGT/ NGT  Last BM: ?  GENITOURINARY:  []Normal [] Incontinent   [x]Oliguria/Anuria   []Purcell  MUSCULOSKELETAL:   []Normal   []Weakness  [x]Bed/Wheelchair bound []Edema  NEUROLOGIC:   []No focal deficits  [x] Cognitive impairment  [x] Dysphagia []Dysarthria [] Paresis []Other   SKIN:   [x]Normal   []Pressure ulcer(s)  []Rash      Vital Signs Last 24 Hrs  T(C): 36.2 (17 Jun 2021 17:00), Max: 36.7 (16 Jun 2021 22:27)  T(F): 97.2 (17 Jun 2021 17:00), Max: 98.1 (16 Jun 2021 22:27)  HR: 105 (17 Jun 2021 17:00) (94 - 105)  BP: 150/72 (17 Jun 2021 17:00) (140/62 - 160/80)  BP(mean): --  RR: 20 (17 Jun 2021 17:00) (20 - 21)  SpO2: 90% (17 Jun 2021 17:00) (90% - 97%) I&O's Summary      LABS: None new    RADIOLOGY & ADDITIONAL STUDIES: None new    PROTEIN CALORIE MALNUTRITION PRESENT: [ ]mild [ ]moderate [ ]severe [ ]underweight [ ]morbid obesity  [] PPSV2 < or = 30% [] significant weight loss [] poor nutritional intake [] anasarca [] catabolic state   Albumin, Serum: 3.0 g/dL (06-04-21 @ 19:10)   Artificial Nutrition []     REFERRALS:   []Chaplaincy  [x]Hospice  []Child Life  [x]Social Work  []Case management []Holistic Therapy []Physical Therapy []Dietary     Electronically signed by:  Alpa Briones RN Case Manager  Electronically signed on:  2021-06-16  10:43

## 2021-06-18 NOTE — PROGRESS NOTE ADULT - SUBJECTIVE AND OBJECTIVE BOX
[facility] Division of Hospital Medicine  Giorgio Robin MD  Pager [phone]    Patient is a 89y old  Female who presents with a chief complaint of Dehydration (18 Jun 2021 13:16)      SUBJECTIVE / OVERNIGHT EVENTS: Pt comfortable      MEDICATIONS  (STANDING):  HYDROmorphone  Injectable 1 milliGRAM(s) IV Push every 6 hours  piperacillin/tazobactam IVPB.. 3.375 Gram(s) IV Intermittent every 12 hours    MEDICATIONS  (PRN):  glycopyrrolate Injectable 0.4 milliGRAM(s) IV Push every 4 hours PRN Excessive Secretions  HYDROmorphone  Injectable 1 milliGRAM(s) IV Push every 2 hours PRN Moderate/Severe Pain  LORazepam   Injectable 0.5 milliGRAM(s) IV Push every 4 hours PRN Anxiety/Agitation      CAPILLARY BLOOD GLUCOSE        I&O's Summary      PHYSICAL EXAM:  Vital Signs Last 24 Hrs  T(C): 36.6 (18 Jun 2021 12:10), Max: 37.1 (17 Jun 2021 20:33)  T(F): 97.8 (18 Jun 2021 12:10), Max: 98.7 (17 Jun 2021 20:33)  HR: 105 (18 Jun 2021 12:10) (63 - 113)  BP: 130/68 (18 Jun 2021 12:10) (130/68 - 162/55)  BP(mean): --  RR: 20 (18 Jun 2021 12:10) (20 - 24)  SpO2: 96% (18 Jun 2021 12:10) (90% - 96%)  CONSTITUTIONAL: NAD  EYES: EOMI, conjunctiva and sclera clear  ENMT: Moist oral mucosa  NECK: Supple  RESPIRATORY: Breathing unlabored, CTAB  CARDIOVASCULAR: S1S2 no MRG  ABDOMEN: Nontender to palpation, normoactive bowel sounds, no rebound/guarding  MUSCULOSKELETAL: no clubbing or cyanosis of digits  NEUROLOGY: No focal deficits   SKIN: No rashes or lesions    CODE: FULL

## 2021-06-18 NOTE — HOSPICE CARE NOTE - CONVESATION DETAILS
f/u call to daughter regarding decision to transfer to [facility] hospice. Message left with  # [phone], no call back received.    Collaboration with LUZ Yuen, no plans to transfer patient at this time.     HAIM Mcfadden RN 
Referral received, phone call to daughters Lucrecia message left with  # [phone], awaiting call back. Patient approved for inpatient hospice.     Will coordinate transfer with SW once this RN speaks to family and preferred inpt facility known.    MARIAMA Arreola  [phone]

## 2021-06-18 NOTE — PROGRESS NOTE ADULT - SUBJECTIVE AND OBJECTIVE BOX
[facility] Geriatrics and Palliative Care  Puneet Sandoval, Palliative Care Attending  Contact Info: Page [phone] (including Nights/Weekend), message on Microsoft Teams (Puneet Sandoval), or leave VM at Palliative Office [phone] (Non-Urgent)    SUBJECTIVE AND OBJECTIVE:  INTERVAL HPI/OVERNIGHT EVENTS: Interval events noted, symptom burden increasing overnight. Patient required PRNs of Dilaudid x4 in past 24hrs. No unexpected adverse effects of opiates noted. Daughter at bedside, family is unsure about transferring to inpatient hospice.    Allergies  No Known Allergies    MEDICATIONS  (STANDING):  HYDROmorphone  Injectable 1 milliGRAM(s) IV Push every 6 hours  piperacillin/tazobactam IVPB.. 3.375 Gram(s) IV Intermittent every 12 hours    MEDICATIONS  (PRN):  glycopyrrolate Injectable 0.4 milliGRAM(s) IV Push every 4 hours PRN Excessive Secretions  HYDROmorphone  Injectable 1 milliGRAM(s) IV Push every 2 hours PRN Moderate/Severe Pain  LORazepam   Injectable 0.5 milliGRAM(s) IV Push every 4 hours PRN Anxiety/Agitation      ITEMS UNCHECKED ARE NOT PRESENT    PRESENT SYMPTOMS: []Unable to obtain due to poor mentation   Source if other than patient:  []Family   []Team     PRESENT SYMPTOMS: [x]Unable to obtain due to poor mentation   Source if other than patient:  [x]Family   []Team     Pain: [x] yes [ ] no  QOL impact -   Location -                    Aggravating factors -  Quality -  Radiation -  Timing -  Severity (0-10 scale):  Minimal acceptable level (0-10 scale):    PAIN AD Score: 2  http://geriatrictoolkit.[place].[facility]/cog/painad.pdf (press ctrl +  left click to view)    Dyspnea:                           []Mild  []Moderate []Severe  Anxiety:                             []Mild []Moderate []Severe  Fatigue:                             []Mild []Moderate []Severe  Nausea:                             []Mild []Moderate []Severe  Loss of appetite:              []Mild []Moderate []Severe  Constipation:                    []Mild []Moderate []Severe    Other Symptoms:  []All other review of systems negative     Palliative Performance Status Version 2: 10%    PHYSICAL EXAM:  GENERAL:  []Alert  []Oriented x   []Lethargic  []Cachexia  [x]Unarousable  []Verbal  [x]Non-Verbal  Behavioral:   [] Anxiety  [x] Delirium [] Agitation [] Other  HEENT:  []Normal   [x]Dry mouth   []ET Tube/Trach  []Oral lesions  PULMONARY:   []Clear [x]Tachypnea  []Audible excessive secretions   []Rhonchi        []Right []Left []Bilateral  []Crackles        []Right []Left []Bilateral  []Wheezing     []Right []Left []Bilateral  CARDIOVASCULAR:    [x]Regular []Irregular []Tachy  []Titus []Murmur []Other  GASTROINTESTINAL:  [x]Soft  []Distended   [x]+BS  [x]Non tender []Tender  []PEG []OGT/ NGT  Last BM: ?  GENITOURINARY:  []Normal [] Incontinent   [x]Oliguria/Anuria   []Purcell  MUSCULOSKELETAL:   []Normal   []Weakness  [x]Bed/Wheelchair bound []Edema  NEUROLOGIC:   []No focal deficits  [x] Cognitive impairment  [x] Dysphagia []Dysarthria [] Paresis []Other   SKIN:   [x]Normal   []Pressure ulcer(s)  []Rash      Vital Signs Last 24 Hrs  T(C): 36.6 (18 Jun 2021 12:10), Max: 37.1 (17 Jun 2021 20:33)  T(F): 97.8 (18 Jun 2021 12:10), Max: 98.7 (17 Jun 2021 20:33)  HR: 105 (18 Jun 2021 12:10) (63 - 113)  BP: 130/68 (18 Jun 2021 12:10) (130/68 - 162/55)  BP(mean): --  RR: 20 (18 Jun 2021 12:10) (20 - 24)  SpO2: 96% (18 Jun 2021 12:10) (90% - 96%) I&O's Summary      LABS: None new    RADIOLOGY & ADDITIONAL STUDIES: None new    PROTEIN CALORIE MALNUTRITION PRESENT: [ ]mild [ ]moderate [ ]severe [ ]underweight [ ]morbid obesity  [] PPSV2 < or = 30% [] significant weight loss [] poor nutritional intake [] anasarca [] catabolic state   Albumin, Serum: 3.0 g/dL (06-04-21 @ 19:10)   Artificial Nutrition []     REFERRALS:   [x]Chaplaincy  [x]Hospice  []Child Life  [x]Social Work  []Case management []Holistic Therapy []Physical Therapy []Dietary

## 2021-06-18 NOTE — CHART NOTE - NSCHARTNOTEFT_GEN_A_CORE
Pt 89 yo female with PMHx of anemia, recurrent UTI presented from home for weakness and lethargy - per chart review. Of note Pt with Sepsis, Severe dehydration, Acute renal failure, severe malnutrition.      Of note Pt with dysphagia, S/S evaluation, did not pass; Pt's family wants pleasure feeds in the long term; Comfort measures only; Pt DNR/DNI.   Of note no more blood draws or finger sticks; pleasure feeds only; Dilaudid and Ativan ATC and PRN.     Aggressive nutrition interventions may not be appropriate at present. Consult nutrition if warranted; RDN remains available.

## 2021-06-18 NOTE — CHART NOTE - NSCHARTNOTESELECT_GEN_ALL_CORE
Event Note
Event Note
asp pna/Event Note
Event Note
Follow-up/Nutrition Services

## 2021-06-18 NOTE — PROGRESS NOTE ADULT - PROBLEM SELECTOR PLAN 7
.  Complex medical decision making / symptom management in the setting of advanced illness.    Patient is in the dying phase.  Will continue to follow for ongoing titration of palliative regimen.   Emotional support provided, questions answered.  Active Psychosocial Referrals: LUZ CASTANEDA    For new or uncontrolled symptoms, please page the Palliative Medicine team ([facility] #[phone]).   The service is available 24/7 (including nights & weekends) to provide symptom management recommendations over the phone as appropriate

## 2021-06-18 NOTE — PROGRESS NOTE ADULT - PROBLEM SELECTOR PLAN 6
.  Patient is DNR/DNI, MOLST in chart  -discussed with patient's daughters at bedside, transitioning to comfort measures  -patient would be appropriate for inpatient hospice, referral placed by primary team but family does not want to transfer the patient at this time

## 2021-06-19 NOTE — PROGRESS NOTE ADULT - PROVIDER SPECIALTY LIST ADULT
Hospitalist
Palliative Care
Hospitalist
Palliative Care
Hospitalist
Hospitalist
Palliative Care
Hospitalist

## 2021-06-19 NOTE — PROGRESS NOTE ADULT - NSICDXPILOT_GEN_ALL_CORE
[place]

## 2021-06-19 NOTE — DISCHARGE NOTE FOR THE EXPIRED PATIENT - HOSPITAL COURSE
89 y/o female with a PMHx of anemia, osteoporosis and recurrent UTI presents from home for weakness and lethargy, found to have sepsis likely secondary to aspiration pneumonia with course complicated by acute renal failure and hypernatremia.      ·  Problem: Acute renal failure.  Plan: TRINH likely d/t  ATN   - Renal function not improving despite hydration   - avoid nephrotoxins  - no evidence of obstruction or hydro on CT A/P.       ·  Problem: Sepsis, due to unspecified organism, unspecified whether acute organ dysfunction present.  Plan: Recurrent sepsis (hypothermia, leukocytosis) likely d/t aspiration PNA   CXR shows R infiltrate   Continue w/ Zosyn.       ·  Problem: Metabolic encephalopathy.  Plan: Due to UTI and hypernatremia/TRINH  - avoid sedating agents  - CTH with advanced microvascular type changes  - do not expect significant improvement given persistent uremia.       ·  Problem: Anemia, unspecified type.  Plan: - chronic anemia  - iron studies reviewed  - s/p Venofer 100mg daily x 3 days.       ·  Problem: UTI (urinary tract infection).  Plan: Patient w/ sepsis on admission d/t UTI, sepsis now resolved. Recent ED visit for UTI. urine culture at that time showing pansensitive e.coli.   CT a/p showing cystitis   - s/p Ceftriaxone   - Bcx NGTD, Ucx with normal  caridad.       Problem: Essential hypertension. Plan: - Hydralazine prn.      ·  Problem: Dysphagia, unspecified type.  Plan: - in setting of encephalopathy   - S/S evaluation, did not pass  - Family accepting pleasure feeds in the long term  - Appreciate Palliative care recs  - Comfort measures  - extensive discussion of prognosis with pt's three daughters on 6/16, they accept and understand  - offering inpatient hospice, family considering.     As per families request patient is DNR/DNI.

## 2021-06-19 NOTE — PROGRESS NOTE ADULT - PROBLEM SELECTOR PROBLEM 2
Hypernatremia
Sepsis, due to unspecified organism, unspecified whether acute organ dysfunction present
Acute renal failure
Dysphagia
Acute renal failure
Acute renal failure
Sepsis, due to unspecified organism, unspecified whether acute organ dysfunction present
Acute renal failure
Sepsis, due to unspecified organism, unspecified whether acute organ dysfunction present
Dysphagia
Hypernatremia
Dysphagia
Hypernatremia
Dysphagia
Sepsis, due to unspecified organism, unspecified whether acute organ dysfunction present
Acute renal failure
Dysphagia
Acute renal failure

## 2021-06-19 NOTE — PROGRESS NOTE ADULT - PROBLEM SELECTOR PLAN 4
Due to UTI and hypernatremia   - correct Na   - Treat sepsis/UTI  - S/S evaluation: currently PO nutrition contraindicated. Will reattempt as hypernatremia and infection resolving
.  Significant Renal Failure, baseline Cr 0.45 in 5/2021  -no improvement over the course of the hospitalization and is expected to lead to patient's death  -this would be the patient's hospice diagnosis
Due to UTI and hypernatremia/TRINH  - corrected Na   - s/p abx for UTI  - monitor Cr   - avoid sedating agents
- chronic anemia  - iron studies reviewed  - s/p Venofer 100mg daily x 3 days
Due to UTI and hypernatremia/TRINH  - corrected Na   - s/p abx for UTI  - avoid sedating agents
Sepsis (hypothermia, leukocytosis) likely d/t aspiration PNA   CXR shows R infiltrate   Started on Zosyn
Due to UTI and hypernatremia/TRINH  - avoid sedating agents  - CTH with advanced microvascular type changes  - do not expect significant improvement given persistent uremia
Due to UTI and hypernatremia/TRINH  - correct Na   - s/p abx for UTI  - monitor Cr   - avoid sedating agents
.  Significant Renal Failure, baseline Cr 0.45 in 5/2021  -no improvement over the course of the hospitalization and is expected to lead to patient's death  -this would be the patient's hospice diagnosis
Patient with ?AMS vs refusing to answer questions - daughter providing history. States she is aox3 at baseline.   - will give fluid as above  - Treat sepsis/UTI  - S/S evaluation: currently PO nutrition contraindicated. Will reattempt as hypernatremia and infection resolving
.  Significant Renal Failure, baseline Cr 0.45 in 5/2021  -no improvement over the course of the hospitalization and is expected to lead to patient's death  -this would be the patient's hospice diagnosis
.  Significant Renal Failure, baseline Cr 0.45 in 5/2021  -no improvement over the course of the hospitalization and is expected to lead to patient's death  -this would be an appropriate hospice diagnosis
.  Significant Renal Failure, baseline Cr 0.45 in 5/2021  -would not be a good candidate for RRT and there has been no improvement over the course of the hospitalization  -this would also be an applicable hospice diagnosis
Due to UTI and hypernatremia/TRINH  - avoid sedating agents  - CTH with advanced microvascular type changes  - do not expect significant improvement given persistent uremia
Due to UTI and hypernatremia/TRINH  - correct Na   - s/p abx for UTI  - monitor Cr   - avoid sedating agents
Patient with ?AMS vs refusing to answer questions - daughter providing history. States she is aox3 at baseline.   - will give fluid as above  - Treat sepsis/UTI  - S/S evaluation: currently PO nutrition contraindicated. Will reattempt as hypernatremia and infection resolving
Due to UTI and hypernatremia   - correct Na   - s/p abx for UTI  - avoid sedating agents

## 2021-06-19 NOTE — PROGRESS NOTE ADULT - PROBLEM SELECTOR PLAN 2
Cr 5.55 on admission, likely 2/2 extreme dehydration.   C/w D5 IVF @75cc/hr, Cr slowly downtrending, Na downtrending  - avoid nephrotoxins  - no evidence of obstruction or hydro on Ct a/p   - monitor I/O
Cr 5.55 on admission, likely 2/2 extreme dehydration.   s/p D5 IVF @75cc/hr, Cr slowly downtrending, Na downtrending  - avoid nephrotoxins  - no evidence of obstruction or hydro on CT a/p   - monitor I/O
Recurrent sepsis (hypothermia, leukocytosis) likely d/t aspiration PNA   CXR shows R infiltrate   Continue w/ Zosyn
.  In the setting of encephalopathy  -c/w NGT for meds and nutrition for now  -family reaffirms they would not want a long-term feeding tube  -will continue trial of TF for a few more days with the hope of improved mental status after better pain control and Abx
Recurrent sepsis (hypothermia, leukocytosis) likely d/t aspiration PNA   CXR shows R infiltrate   Continue w/ Zosyn
.  In the setting of encephalopathy  -stop TF as patient is not tolerating  -c/w Abx for now at family request
.  In the setting of encephalopathy  -patient was not tolerating TF, NGT now removed for comfort  -c/w Abx for now at family request
Dehydration from decreased PO intake and sepsis  - Na overcorrected s/p D5W and free water boluses   - hold D5W and free water boluses via NGT at this time   - Monitor BMP
Dehydration from decreased PO intake and sepsis  - resolved s/p D5W  - hold D5W and free water boluses via NGT at this time d/t hyponatremia   - Monitor BMP
.  In the setting of encephalopathy  -patient was not tolerating TF, NGT now removed for comfort  -c/w Abx for now at family request
Cr 5.55 on admission, likely 2/2 extreme dehydration.   Cr slowly downtrending w/ IVF   - avoid nephrotoxins  - no evidence of obstruction or hydro on CT a/p   - monitor I/O
.  In the setting of encephalopathy  -patient was not tolerating TF, NGT now removed for comfort  -c/w Abx for now at family request
Recurrent sepsis (hypothermia, leukocytosis) likely d/t aspiration PNA   CXR shows R infiltrate   Continue w/ Zosyn
/5.55; presenting with acute renal failure likely 2/2 extreme dehydration. Has been urinating.   C/w D5 IVF @75cc/hr, Cr slowly downtrending, repeat BMP tonight  - avoid nephrotoxins  - no evidence of obstruction or hydro on Ct a/p   - monitor I/O
Dehydration from decreased PO intake and sepsis  - Na overcorrected s/p D5W and free water boluses   - hold D5W and free water boluses via NGT at this time   - Monitor BMP
Recurrent sepsis (hypothermia, leukocytosis) likely d/t aspiration PNA   CXR shows R infiltrate   Continue w/ Zosyn
TRINH likely d/t dehydration and ATN   - Renal function not improving despite IVF  - avoid nephrotoxins  - no evidence of obstruction or hydro on CT A/P   - monitor I/O
Cr 5.55 on admission, likely 2/2 extreme dehydration.   - c/w IVF  - avoid nephrotoxins  - no evidence of obstruction or hydro on CT A/P   - monitor I/O
Cr 5.55 on admission, likely 2/2 extreme dehydration.   - c/w IVF  - avoid nephrotoxins  - no evidence of obstruction or hydro on CT A/P   - monitor I/O
Cr 5.55 on admission, likely 2/2 extreme dehydration.   Cr slowly downtrending w/ IVF   - c/w IVF  - avoid nephrotoxins  - no evidence of obstruction or hydro on CT a/p   - monitor I/O

## 2021-06-19 NOTE — PROGRESS NOTE ADULT - PROBLEM SELECTOR PLAN 5
- chronic anemia  - iron studies reviewed  - s/p Venofer 100mg daily x 3 days
TWI in lateral leads; new on EKG, suspect rate related TWI  - monitor on tele  Trop downtrending, elevated likely in setting of renal failure and sepsis
TWI in lateral leads; new on EKG, suspect rate related TWI  - monitor on tele  Trop downtrending, elevated likely in setting of renal failure and sepsis
Patient w/ sepsis on admission d/t UTI, sepsis now resolved. Recent ED visit for UTI. urine culture at that time showing pansensitive e.coli.   CT a/p showing cystitis   - s/p Ceftriaxone   - Bcx NGTD, Ucx with normal  caridad
.  PPSV = 20%, requires total assistance for all ADLs  -c/w supportive care
- chronic anemia  - iron studies reviewed  - family requesting iron, s/p Venofer 100mg daily x 3 days
.  PPSV = 10%, requires total assistance for all ADLs  -c/w supportive care
Patient w/ sepsis on admission d/t UTI, sepsis now resolved. Recent ED visit for UTI. urine culture at that time showing pansensitive e.coli.   CT a/p showing cystitis   - s/p Ceftriaxone   - Bcx NGTD, Ucx with normal  caridad
.  PPSV = 10%, requires total assistance for all ADLs  -c/w supportive care
Due to UTI and hypernatremia/TRINH  - corrected Na   - s/p abx for UTI  - avoid sedating agents
Patient w/ sepsis on admission d/t UTI, sepsis now resolved. Recent ED visit for UTI. urine culture at that time showing pansensitive e.coli.   CT a/p showing cystitis   - s/p Ceftriaxone   - Bcx NGTD, Ucx with normal  caridad
Patient w/ sepsis on admission d/t UTI, sepsis now resolved. Recent ED visit for UTI. urine culture at that time showing pansensitive e.coli.   CT a/p showing cystitis   - s/p Ceftriaxone   - Bcx NGTD, Ucx with normal  caridad
- repeat blood and urine cultures   - monitor off antibiotics for now   - check TSH  - warming blanket
.  PPSV = 10%, requires total assistance for all ADLs  -c/w supportive care
- chronic anemia  - check iron studies  - family requesting IV iron therapy
- chronic anemia  - iron studies reviewed  - family requesting iron, s/p Venofer 100mg daily x 3 days
.  PPSV = 10%, requires total assistance for all ADLs  -c/w supportive care
TWI in lateral leads; new on EKG, suspect rate related TWI  - monitor on tele  Trop downtrending, elevated likely in setting of renal failure and sepsis
TWI in lateral leads; new on EKG, suspect rate related TWI  - monitor on tele  Trop downtrending, elevated likely in setting of renal failure and sepsis
- chronic anemia  - iron studies reviewed  - family requesting IV iron, start Venofer 100mg daily x 3 days

## 2021-06-19 NOTE — PROGRESS NOTE ADULT - PROBLEM SELECTOR PROBLEM 7
Encounter for palliative care
Dysphagia, unspecified type
Essential hypertension
Lumbar compression fracture
UTI (urinary tract infection)
Dysphagia, unspecified type
Lumbar compression fracture
UTI (urinary tract infection)
Encounter for palliative care
FTT (failure to thrive) in adult
Lumbar compression fracture
Dysphagia, unspecified type
Encounter for palliative care
Bradycardia
Encounter for palliative care
Essential hypertension
Bradycardia
Lumbar compression fracture
Encounter for palliative care
Bradycardia

## 2021-06-19 NOTE — PROGRESS NOTE ADULT - PROBLEM SELECTOR PROBLEM 3
Hypernatremia
Hypernatremia
Metabolic encephalopathy
Hypernatremia
Hypernatremia
Metabolic encephalopathy
Hypernatremia
Metabolic encephalopathy
Hypernatremia
Metabolic encephalopathy
Sepsis, due to unspecified organism, unspecified whether acute organ dysfunction present
Metabolic encephalopathy
Sepsis, due to unspecified organism, unspecified whether acute organ dysfunction present
Sepsis, due to unspecified organism, unspecified whether acute organ dysfunction present
Hypernatremia
Hypernatremia

## 2021-06-19 NOTE — PROGRESS NOTE ADULT - PROBLEM SELECTOR PROBLEM 4
Metabolic encephalopathy
Anemia, unspecified type
Metabolic encephalopathy
TRINH (acute kidney injury)
Sepsis, due to unspecified organism, unspecified whether acute organ dysfunction present
Metabolic encephalopathy
Anemia, unspecified type
Anemia, unspecified type
Metabolic encephalopathy
TRINH (acute kidney injury)
Metabolic encephalopathy
Metabolic encephalopathy
TRINH (acute kidney injury)
TRINH (acute kidney injury)
Metabolic encephalopathy
Anemia, unspecified type
TRINH (acute kidney injury)
Metabolic encephalopathy

## 2021-06-19 NOTE — PROGRESS NOTE ADULT - PROBLEM SELECTOR PLAN 1
TRINH likely d/t  ATN   - Renal function not improving despite hydration   - avoid nephrotoxins  - no evidence of obstruction or hydro on CT A/P
.  -ordered Dilaudid 0.5mg IV q6h ATC  -ordered Dilaudid 0.5mg IV q2h PRN for Moderate/Severe Pain    Family in agreement with symptom-directed care. Doses escalated based on PRN use over the past 24hrs
TRINH likely d/t  ATN   - Renal function not improving despite hydration   - avoid nephrotoxins  - no evidence of obstruction or hydro on CT A/P
TRINH likely d/t  ATN   - Renal function not improving despite hydration   - avoid nephrotoxins  - no evidence of obstruction or hydro on CT A/P
.  -c/w Dilaudid 0.5mg IV q6h ATC  -c/w Dilaudid 0.5mg IV q2h PRN for Moderate/Severe Pain    Family in agreement with symptom-directed care. Doses escalated based on PRN use over the past 24hrs
Patient w/ sepsis d/t UTI, sepsis now resolved. Recent ED visit for UTI. urine culture at that time showing pansensitive e.coli.   CT a/p showing cystitis   - s/p Ceftriaxone   - Bcx NGTD, Ucx with normal  caridad
Patient presenting sepsis d/t UTI. Recent ED visit for UTI. urine culture at that time showing pansensitive e.coli.   CT a/p showing cystitis.   - Will continue with IV ceftriaxone. WBC downtrending  - Bcx NGTD, Ucx with nml  caridad
.  -c/w Dilaudid 1mg IV q6h ATC  -c/w Dilaudid 1mg IV q2h PRN for Moderate/Severe Pain  -if patient requires >3 PRNs in the next 24hrs, can start a Dilaudid gtt @0.5mg/hr    Family in agreement with symptom-directed care. Doses escalated based on PRN use over the past 24hrs
.  -ordered Tylenol 650mg via NGT q8h ATC x2 days  -ordered Dilaudid 0.2mg IV q8h ATC (with hold parameters)  -ordered Dilaudid 0.2mg IV q4h PRN for Moderate/Severe Pain    Family is amenable to the above and would like to see the patient more comfortable.
Patient presenting septic with +U/A. Recent ed visit for UTI. urine culture at that time showing pansensitive e.coli.   CT a/p showing cystitis.   - Will continue with IV ceftriaxone.   - follow up urine culture, Bcx
TRINH likely d/t dehydration and ATN   - Renal function not improving despite hydration   - avoid nephrotoxins  - no evidence of obstruction or hydro on CT A/P
Patient w/ sepsis d/t UTI, sepsis now resolved. Recent ED visit for UTI. urine culture at that time showing pansensitive e.coli.   CT a/p showing cystitis.   - s/p Ceftriaxone   - Bcx NGTD, Ucx with nml  caridad
TRINH likely d/t  ATN   - Renal function not improving despite hydration   - avoid nephrotoxins  - no evidence of obstruction or hydro on CT A/P
Patient presenting septic with +U/A. Recent ed visit for UTI. urine culture at that time showing pansensitive e.coli.   CT a/p showing cystitis.   - Will continue with IV ceftriaxone. WBC downtrending  Bcx NGTD, Ucx with nml  caridad
Patient w/ sepsis d/t UTI, sepsis now resolved. Recent ED visit for UTI. urine culture at that time showing pansensitive e.coli.   CT a/p showing cystitis   - s/p Ceftriaxone   - Bcx NGTD, Ucx with normal  caridad
Patient presenting sepsis d/t UTI. Recent ED visit for UTI. urine culture at that time showing pansensitive e.coli.   CT a/p showing cystitis.   - s/p Ceftriaxone   - Bcx NGTD, Ucx with nml  caridad
TRINH likely d/t dehydration and ATN   - Renal function not improving despite hydration   - avoid nephrotoxins  - no evidence of obstruction or hydro on CT A/P   - monitor I/O
TRINH likely d/t dehydration and ATN   - Renal function not improving despite hydration   - avoid nephrotoxins  - no evidence of obstruction or hydro on CT A/P   - monitor I/O
.  -ordered Dilaudid 0.2mg IV q6h ATC  -ordered Dilaudid 0.2mg IV q2h PRN for Moderate/Severe Pain    Family wants to begin transitioning towards symptom-directed care
Patient w/ sepsis d/t UTI, sepsis now resolved. Recent ED visit for UTI. urine culture at that time showing pansensitive e.coli.   CT a/p showing cystitis   - s/p Ceftriaxone   - Bcx NGTD, Ucx with nml  caridad

## 2021-06-19 NOTE — PROGRESS NOTE ADULT - ASSESSMENT
89 yo F pmhx anemia, recent UTI presenting from home for weakness and lethargy found to have sepsis 2/2 UTI, Severe dehydration, and Acute renal failure. 
89yo F with PMH of Anemia, and Osteoporosis p/w weakness in the setting of TRINH/UTI. Palliative consulted for complex medical decision making in the setting of advanced illness.    ·	ordered Tylenol 650mg via NGT q8h ATC x2 days  ·	ordered Dilaudid 0.2mg IV q8h ATC (with hold parameters) and Dilaudid 0.2mg IV q4h PRN for Moderate/Severe Pain  ·	GOC as noted above, briefly introduced the role of hospice services but will need further discussion pending clinical course
89 yo F pmhx anemia, recent UTI presenting from home for weakness and lethargy found to have sepsis 2/2 UTI, Severe dehydration, and Acute renal failure. 
89 yo F pmhx anemia, recent UTI presenting from home for weakness and lethargy found to have sepsis 2/2 UTI, Severe dehydration, and Acute renal failure. 
89 yo F pmhx anemia, recent UTI presenting from home for weakness and lethargy found to have sepsis 2/2 UTI, Severe dehydration, and Acute renal failure. 
89yo F with PMH of Anemia, and Osteoporosis p/w weakness in the setting of TRINH/UTI. Palliative consulted for complex medical decision making in the setting of advanced illness.    ·	prolonged time spent reinforcing disease trajectory and prognosis; no designated HCP so all siblings acting collectively with Delmy as spokesperson, all involved are in agreement with comfort measures  ·	ordered Dilaudid 0.5mg IV q6h ATC  ·	ordered Dilaudid 0.5mg IV q2h PRN for Pain and/or Dyspnea
89yo F with PMH of Anemia, and Osteoporosis p/w weakness in the setting of TRINH/UTI. Palliative consulted for complex medical decision making in the setting of advanced illness.    ·	discontinue blood draws/FS/HSQ; stop tube feeds and allow pleasure feeds  ·	asked floor to allow EOL visitation  ·	broached potential transfer to inpatient hospice but family to overwhelmed to consider right now    ·	ordered Dilaudid 0.2mg IV q6h ATC  ·	ordered Dilaudid 0.2mg IV q2h PRN for Pain and/or Dyspnea  ·	ordered Ativan 0.5mg IV q4h PRN for Anxiety/Agitation
89 yo F pmhx anemia, recent UTI presenting from home for weakness and lethargy found to have sepsis 2/2 UTI, Severe dehydration, and Acute renal failure. 
89 yo F pmhx anemia, recent UTI presenting from home for weakness and lethargy found to have sepsis 2/2 UTI, Severe dehydration, and Acute renal failure. 
89 yo F pmhx anemia, recent UTI presenting from home for weakness and lethargy found to have sepsis 2/2 UTI, Severe dehydration, and Acute renal failure. 
89yo F with PMH of Anemia, and Osteoporosis p/w weakness in the setting of TRINH/UTI. Palliative consulted for complex medical decision making in the setting of advanced illness.    ·	c/w symptom regimen as ordered
89 yo F pmhx anemia, recent UTI presenting from home for weakness and lethargy found to have sepsis 2/2 UTI, Severe dehydration, and Acute renal failure. 
89 yo F pmhx anemia, recent UTI presenting from home for weakness and lethargy found to have sepsis 2/2 UTI, Severe dehydration, and Acute renal failure. 
89yo F with PMH of Anemia, and Osteoporosis p/w weakness in the setting of TRINH/UTI. Palliative consulted for complex medical decision making in the setting of advanced illness.    ·	increased dose of ATC and PRN Dilaudid to 1mg  ·	if patient requires >3 PRNs in the next 24hrs, can start a Dilaudid gtt @0.5mg/hr
89 yo F pmhx anemia, recent UTI presenting from home for weakness and lethargy found to have sepsis 2/2 UTI, Severe dehydration, and Acute renal failure. 
89 yo F pmhx anemia, recent UTI presenting from home for weakness and lethargy found to have sepsis 2/2 UTI, Severe dehydration, and Acute renal failure.

## 2021-06-19 NOTE — PROGRESS NOTE ADULT - PROBLEM SELECTOR PROBLEM 5
Functional quadriplegia
Anemia, unspecified type
Anemia, unspecified type
T wave inversion in EKG
UTI (urinary tract infection)
Functional quadriplegia
Anemia, unspecified type
Anemia, unspecified type
UTI (urinary tract infection)
T wave inversion in EKG
Functional quadriplegia
T wave inversion in EKG
UTI (urinary tract infection)
UTI (urinary tract infection)
Anemia, unspecified type
Metabolic encephalopathy
Functional quadriplegia
T wave inversion in EKG
Functional quadriplegia
Hypothermia, initial encounter

## 2021-06-19 NOTE — PROGRESS NOTE ADULT - NUTRITIONAL ASSESSMENT
This patient has been assessed with a concern for Malnutrition and has been determined to have a diagnosis/diagnoses of Severe protein-calorie malnutrition.    This patient is being managed with:   Diet NPO with Tube Feed-  Tube Feeding Modality: Nasogastric  Jevity 1.2 Scottie (JEVITY1.2RTH)  Total Volume for 24 Hours (mL): 240  Continuous  Starting Tube Feed Rate {mL per Hour}: 10  Increase Tube Feed Rate by (mL): 0  Until Goal Tube Feed Rate (mL per Hour): 10  Tube Feed Duration (in Hours): 24  Tube Feed Start Time: 16:15  Entered: Jun 12 2021  4:09PM    
This patient has been assessed with a concern for Malnutrition and has been determined to have a diagnosis/diagnoses of Severe protein-calorie malnutrition.    This patient is being managed with:   Diet NPO with Tube Feed-  Tube Feeding Modality: Nasogastric  Jevity 1.2 Scottie (JEVITY1.2RTH)  Total Volume for 24 Hours (mL): 720  Continuous  Starting Tube Feed Rate {mL per Hour}: 10  Increase Tube Feed Rate by (mL): 10     Every 4 hours  Until Goal Tube Feed Rate (mL per Hour): 30  Tube Feed Duration (in Hours): 24  Tube Feed Start Time: 19:20  Entered: Jermain 10 2021  7:20PM    
This patient has been assessed with a concern for Malnutrition and has been determined to have a diagnosis/diagnoses of Severe protein-calorie malnutrition.      
This patient has been assessed with a concern for Malnutrition and has been determined to have a diagnosis/diagnoses of Severe protein-calorie malnutrition.    This patient is being managed with:   Diet Dysphagia 1 Pureed-Thin Liquids-  Entered: Jermain 15 2021  4:42PM    
This patient has been assessed with a concern for Malnutrition and has been determined to have a diagnosis/diagnoses of Severe protein-calorie malnutrition.    This patient is being managed with:   Diet Dysphagia 1 Pureed-Thin Liquids-  Entered: Jermain 15 2021  4:42PM    
This patient has been assessed with a concern for Malnutrition and has been determined to have a diagnosis/diagnoses of Severe protein-calorie malnutrition.    This patient is being managed with:   Diet NPO-  Entered: Jun 5 2021  3:01AM    
This patient has been assessed with a concern for Malnutrition and has been determined to have a diagnosis/diagnoses of Severe protein-calorie malnutrition.    This patient is being managed with:   Diet Dysphagia 1 Pureed-Thin Liquids-  Entered: Jermain 15 2021  4:42PM    
This patient has been assessed with a concern for Malnutrition and has been determined to have a diagnosis/diagnoses of Severe protein-calorie malnutrition.    This patient is being managed with:   Diet NPO with Tube Feed-  Tube Feeding Modality: Nasogastric  Jevity 1.2 Scottie (JEVITY1.2RTH)  Total Volume for 24 Hours (mL): 240  Continuous  Starting Tube Feed Rate {mL per Hour}: 10  Increase Tube Feed Rate by (mL): 0  Until Goal Tube Feed Rate (mL per Hour): 10  Tube Feed Duration (in Hours): 24  Tube Feed Start Time: 16:15  Entered: Jun 12 2021  4:09PM    
This patient has been assessed with a concern for Malnutrition and has been determined to have a diagnosis/diagnoses of Severe protein-calorie malnutrition.    This patient is being managed with:   Diet NPO-  Entered: Jun 5 2021  3:01AM    

## 2021-06-19 NOTE — PROGRESS NOTE ADULT - SUBJECTIVE AND OBJECTIVE BOX
[facility] Division of Hospital Medicine  Giorgio Robin MD  Pager [phone]    Patient is a 89y old  Female who presents with a chief complaint of Dehydration (18 Jun 2021 16:39)      SUBJECTIVE / OVERNIGHT EVENTS: comfortable      MEDICATIONS  (STANDING):  HYDROmorphone  Injectable 1 milliGRAM(s) IV Push every 6 hours    MEDICATIONS  (PRN):  glycopyrrolate Injectable 0.4 milliGRAM(s) IV Push every 4 hours PRN Excessive Secretions  HYDROmorphone  Injectable 1 milliGRAM(s) IV Push every 2 hours PRN Moderate/Severe Pain  LORazepam   Injectable 0.5 milliGRAM(s) IV Push every 4 hours PRN Anxiety/Agitation      CAPILLARY BLOOD GLUCOSE        I&O's Summary      PHYSICAL EXAM:  Vital Signs Last 24 Hrs  T(C): 36.3 (19 Jun 2021 12:22), Max: 37.1 (19 Jun 2021 06:32)  T(F): 97.3 (19 Jun 2021 12:22), Max: 98.7 (19 Jun 2021 06:32)  HR: 101 (19 Jun 2021 12:22) (100 - 106)  BP: 137/72 (19 Jun 2021 12:22) (124/63 - 144/67)  BP(mean): --  RR: 16 (19 Jun 2021 12:22) (16 - 20)  SpO2: 100% (19 Jun 2021 12:22) (99% - 100%)  CONSTITUTIONAL: NAD  EYES: EOMI, conjunctiva and sclera clear  ENMT: Moist oral mucosa  NECK: Supple  RESPIRATORY: Breathing unlabored, CTAB  CARDIOVASCULAR: S1S2 no MRG  ABDOMEN: Nontender to palpation, normoactive bowel sounds, no rebound/guarding  MUSCULOSKELETAL: no clubbing or cyanosis of digits  NEUROLOGY: No focal deficits   SKIN: No rashes or lesions      CODE: DNR

## 2021-10-14 NOTE — ED PROVIDER NOTE - NS ED ATTENDING STATEMENT MOD
Attending with allow for swallow between intakes/alternate food with liquid/crush medication (when feasible)/maintain upright posture during/after eating for 30 mins/oral hygiene/position upright (90 degrees)/small sips/bites allow for swallow between intakes/check mouth frequently for oral residue/pocketing/crush medication (when feasible)/maintain upright posture during/after eating for 30 mins/oral hygiene/position upright (90 degrees)

## 2021-12-16 NOTE — PROGRESS NOTE ADULT - PROBLEM SELECTOR PLAN 6
Patient: Solis Prescott    Procedure: Procedure(s):  Upper endoscopy with biopsies       Diagnosis: Gastroesophageal reflux disease with esophagitis [K21.00]  Diagnosis Additional Information: No value filed.    Anesthesia Type:   MAC     Note:    Oropharynx: oropharynx clear of all foreign objects  Level of Consciousness: drowsy  Oxygen Supplementation: room air    Independent Airway: airway patency satisfactory and stable  Dentition: dentition unchanged  Vital Signs Stable: post-procedure vital signs reviewed and stable  Report to RN Given: handoff report given  Patient transferred to: Phase II    Handoff Report: Identifed the Patient, Identified the Reponsible Provider, Reviewed the pertinent medical history, Discussed the surgical course, Reviewed Intra-OP anesthesia mangement and issues during anesthesia, Set expectations for post-procedure period and Allowed opportunity for questions and acknowledgement of understanding      Vitals:  Vitals Value Taken Time   /84 12/16/21 1355   Temp 98.4  F (36.9  C) 12/16/21 1345   Pulse 78 12/16/21 1355   Resp 16 12/16/21 1350   SpO2 95 % 12/16/21 1355   Vitals shown include unvalidated device data.    Electronically Signed By: KRIS Lara CRNA  December 16, 2021  1:56 PM   TWI in lateral leads; new on EKG, suspect rate related TWI  - monitor on tele  Trop downtrending, elevated likely in setting of renal failure and sepsis - Metoprolol IV q6h  - monitor BP

## 2022-04-25 NOTE — PROGRESS NOTE ADULT - PROBLEM SELECTOR PROBLEM 8
Dysphagia, unspecified type
Essential hypertension
Detail Level: Detailed
FTT (failure to thrive) in adult
Lumbar compression fracture
Discharge planning issues
Dysphagia, unspecified type
Discharge planning issues
Discharge planning issues
Essential hypertension
Bradycardia
Bradycardia
Essential hypertension

## 2023-11-07 NOTE — PATIENT PROFILE ADULT - DO YOU FEEL LIKE HURTING YOURSELF OR OTHERS?
Current longstanding medical problems and acute medical issues addressed with staff. Available data and data elements in on site paper chart reviewed and analyzed. Current external consultant notes reviewed in on site chart. Ordered laboratory testing and imaging will be reviewed when available. Side effects, adverse effects of the medication prescribed today, as well as treatment plan and result expectations have been discussed withthe patient who expresses understanding and desires to proceed. I spent a total of 15 minutes on the date of service which included preparing to see the patient, face-to-face patient care, performing a medically appropriate examination, completing clinical documentation, and on counseling/ eductaing the patient and the family. Please note Nuance Dragon PowerMic III software used for dictation of note,  which may contain minor errors due to ambient noise and indiscriminate speech pickup. no

## 2024-09-12 NOTE — CONSULT NOTE ADULT - PROBLEM/RECOMMENDATION-4
"Subjective   Patient ID: Tiago Mcginnis is a 58 y.o. male who presents for Med Refill. Patient had labs drawn prior to office visit.     HPI Pt is here for refills of medications to treat the following medical conditions. Unless otherwise stated, these conditions are well-controlled, pt is taking medications s Rx, and there are no reported side effects to medications or other treatment: Hypertension, Hyperlipidemia, DM2.     Review of Systems   All other systems reviewed and are negative.      Objective   /80   Pulse 92   Ht 1.753 m (5' 9\")   Wt 122 kg (268 lb)   SpO2 96%   BMI 39.58 kg/m²     Physical Exam  Constitutional:       Appearance: Normal appearance.   Eyes:      Conjunctiva/sclera: Conjunctivae normal.   Cardiovascular:      Rate and Rhythm: Normal rate and regular rhythm.   Pulmonary:      Effort: Pulmonary effort is normal.      Breath sounds: Normal breath sounds.   Abdominal:      Palpations: Abdomen is soft.   Musculoskeletal:         General: Normal range of motion.   Skin:     General: Skin is warm and dry.   Neurological:      Mental Status: He is alert.   Psychiatric:         Mood and Affect: Mood normal.         Assessment/Plan   Diagnoses and all orders for this visit:  Hyperlipidemia, unspecified hyperlipidemia type  -     atorvastatin (Lipitor) 40 mg tablet; Take 1 tablet (40 mg) by mouth once daily. Appointment needed  Hypertension, unspecified type  -     losartan-hydrochlorothiazide (Hyzaar) 100-25 mg tablet; Take 1 tablet by mouth once daily.  Type 2 diabetes mellitus without complication, without long-term current use of insulin (Multi)  -     dulaglutide (Trulicity) 1.5 mg/0.5 mL pen injector injection; Inject 1.5 mg under the skin 1 (one) time per week.       " DISPLAY PLAN FREE TEXT

## 2024-10-14 NOTE — PROGRESS NOTE ADULT - PROBLEM SELECTOR PROBLEM 1
Acute renal failure
Prior to immunization administration, verified patients identity using patient s name and date of birth. Please see Immunization Activity for additional information.     Screening Questionnaire for Adult Immunization    Are you sick today?   No   Do you have allergies to medications, food, a vaccine component or latex?   No   Have you ever had a serious reaction after receiving a vaccination?   No   Do you have a long-term health problem with heart, lung, kidney, or metabolic disease (e.g., diabetes), asthma, a blood disorder, no spleen, complement component deficiency, a cochlear implant, or a spinal fluid leak?  Are you on long-term aspirin therapy?   No   Do you have cancer, leukemia, HIV/AIDS, or any other immune system problem?   No   Do you have a parent, brother, or sister with an immune system problem?   No   In the past 3 months, have you taken medications that affect  your immune system, such as prednisone, other steroids, or anticancer drugs; drugs for the treatment of rheumatoid arthritis, Crohn s disease, or psoriasis; or have you had radiation treatments?   No   Have you had a seizure, or a brain or other nervous system problem?   No   During the past year, have you received a transfusion of blood or blood    products, or been given immune (gamma) globulin or antiviral drug?   No   For women: Are you pregnant or is there a chance you could become       pregnant during the next month?   No   Have you received any vaccinations in the past 4 weeks?   No     Immunization questionnaire answers were all negative.      Patient instructed to remain in clinic for 15 minutes afterwards, and to report any adverse reactions.     Screening performed by Paula Sandhu on 10/14/2024 at 4:49 PM.       
Acute renal failure
UTI (urinary tract infection)
UTI (urinary tract infection)
Generalized pain
Acute renal failure
Generalized pain
Acute renal failure
UTI (urinary tract infection)
Acute renal failure
UTI (urinary tract infection)
Acute renal failure
Generalized pain
UTI (urinary tract infection)
Acute renal failure
UTI (urinary tract infection)